# Patient Record
Sex: MALE | Race: WHITE | Employment: OTHER | URBAN - METROPOLITAN AREA
[De-identification: names, ages, dates, MRNs, and addresses within clinical notes are randomized per-mention and may not be internally consistent; named-entity substitution may affect disease eponyms.]

---

## 2017-04-11 PROCEDURE — 84154 ASSAY OF PSA FREE: CPT | Performed by: UROLOGY

## 2017-04-11 PROCEDURE — 84153 ASSAY OF PSA TOTAL: CPT | Performed by: UROLOGY

## 2017-04-11 PROCEDURE — 36415 COLL VENOUS BLD VENIPUNCTURE: CPT | Performed by: UROLOGY

## 2017-05-14 ENCOUNTER — HOSPITAL ENCOUNTER (EMERGENCY)
Facility: HOSPITAL | Age: 82
Discharge: HOME | End: 2017-05-14
Attending: EMERGENCY MEDICINE
Payer: MEDICARE

## 2017-05-14 ENCOUNTER — APPOINTMENT (OUTPATIENT)
Dept: GENERAL RADIOLOGY | Facility: HOSPITAL | Age: 82
End: 2017-05-14
Payer: MEDICARE

## 2017-05-14 VITALS
TEMPERATURE: 98 F | WEIGHT: 160 LBS | DIASTOLIC BLOOD PRESSURE: 89 MMHG | HEIGHT: 69 IN | OXYGEN SATURATION: 98 % | HEART RATE: 91 BPM | RESPIRATION RATE: 16 BRPM | SYSTOLIC BLOOD PRESSURE: 178 MMHG | BODY MASS INDEX: 23.7 KG/M2

## 2017-05-14 DIAGNOSIS — R07.89 CHEST PAIN, ATYPICAL: Primary | ICD-10-CM

## 2017-05-14 PROCEDURE — 85378 FIBRIN DEGRADE SEMIQUANT: CPT | Performed by: EMERGENCY MEDICINE

## 2017-05-14 PROCEDURE — 84484 ASSAY OF TROPONIN QUANT: CPT

## 2017-05-14 PROCEDURE — 93005 ELECTROCARDIOGRAM TRACING: CPT

## 2017-05-14 PROCEDURE — 99285 EMERGENCY DEPT VISIT HI MDM: CPT

## 2017-05-14 PROCEDURE — 85025 COMPLETE CBC W/AUTO DIFF WBC: CPT

## 2017-05-14 PROCEDURE — 36415 COLL VENOUS BLD VENIPUNCTURE: CPT

## 2017-05-14 PROCEDURE — 71010 XR CHEST AP PORTABLE  (CPT=71010): CPT | Performed by: EMERGENCY MEDICINE

## 2017-05-14 PROCEDURE — 83880 ASSAY OF NATRIURETIC PEPTIDE: CPT | Performed by: EMERGENCY MEDICINE

## 2017-05-14 PROCEDURE — 93010 ELECTROCARDIOGRAM REPORT: CPT

## 2017-05-14 PROCEDURE — 80053 COMPREHEN METABOLIC PANEL: CPT

## 2017-05-14 RX ORDER — NICOTINE POLACRILEX 4 MG/1
20 GUM, CHEWING ORAL DAILY
Qty: 30 TABLET | Refills: 0 | Status: SHIPPED | OUTPATIENT
Start: 2017-05-14 | End: 2017-06-06 | Stop reason: ALTCHOICE

## 2017-05-14 NOTE — ED NOTES
DC instructions and RX handed to pt. No distress noted. Pt thanks staff for care. Denies need for [facility] out of ED.

## 2017-05-14 NOTE — ED PROVIDER NOTES
Patient Seen in: [facility] Emergency Department    History   Patient presents with:  Chest Pain Angina (cardiovascular)    Stated Complaint: chest burning    HPI    80-year-old male complaining of burning chest pain.   The patient states that he has US, dr Bonds Dose    COLONOSCOPY  11/2012    Comment Severe pan-colonic diverticulosis, 3 mm transverse adenoma, large internal hemorrhoids.  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 Luis Smith 05/14/17 0910 98.4 °F (36.9 °C)   Temp src 05/14/17 0910 Temporal   SpO2 05/14/17 0910 98 %   O2 Device 05/14/17 0910 None (Room air)       Current:/89 mmHg  Pulse 91  Temp(Src) 98.4 °F (36.9 °C) (Temporal)  Resp 16  Ht 175.3 cm (5' 9\")  Wt 72.576 k individual orders. RAINBOW DRAW BLUE   RAINBOW DRAW GOLD   RAINBOW DRAW LAVENDER   RAINBOW DRAW LIGHT GREEN      EKG    Rate, intervals and axes as noted on EKG Report.   Rate: 97  Rhythm: Sinus Rhythm  Reading: Nonspecific ST wave abnormality this is an

## 2017-05-14 NOTE — ED NOTES
Round on pt. No distress noted. Pt denies any needs at this time.  Pt denies any chest burning at this time

## 2017-06-06 PROBLEM — Z95.1 S/P CABG X 3: Status: ACTIVE | Noted: 2017-06-06

## 2017-06-06 PROBLEM — I48.0 PAROXYSMAL ATRIAL FIBRILLATION (HCC): Status: ACTIVE | Noted: 2017-06-06

## 2017-06-06 PROBLEM — Z79.899 ON AMIODARONE THERAPY: Status: ACTIVE | Noted: 2017-06-06

## 2017-07-04 PROBLEM — Z79.899 ON AMIODARONE THERAPY: Status: RESOLVED | Noted: 2017-06-06 | Resolved: 2017-07-04

## 2017-10-30 PROCEDURE — 84154 ASSAY OF PSA FREE: CPT | Performed by: UROLOGY

## 2017-10-30 PROCEDURE — 84153 ASSAY OF PSA TOTAL: CPT | Performed by: UROLOGY

## 2017-10-30 PROCEDURE — 36415 COLL VENOUS BLD VENIPUNCTURE: CPT | Performed by: UROLOGY

## 2017-11-08 PROCEDURE — 36415 COLL VENOUS BLD VENIPUNCTURE: CPT | Performed by: INTERNAL MEDICINE

## 2017-11-08 PROCEDURE — 80061 LIPID PANEL: CPT | Performed by: INTERNAL MEDICINE

## 2017-11-10 PROBLEM — M70.62 TROCHANTERIC BURSITIS OF LEFT HIP: Status: ACTIVE | Noted: 2017-11-10

## 2018-07-16 PROBLEM — I70.0 AORTO-ILIAC ATHEROSCLEROSIS (HCC): Status: ACTIVE | Noted: 2018-07-16

## 2018-07-16 PROBLEM — I70.8 AORTO-ILIAC ATHEROSCLEROSIS (HCC): Status: ACTIVE | Noted: 2018-07-16

## 2018-11-06 PROBLEM — M70.62 TROCHANTERIC BURSITIS OF LEFT HIP: Status: RESOLVED | Noted: 2017-11-10 | Resolved: 2018-11-06

## 2018-11-06 PROBLEM — R73.01 IFG (IMPAIRED FASTING GLUCOSE): Status: ACTIVE | Noted: 2018-11-06

## 2018-11-06 PROBLEM — I65.23 BILATERAL CAROTID ARTERY STENOSIS: Status: ACTIVE | Noted: 2018-11-06

## 2018-11-06 PROBLEM — I25.10 CORONARY ARTERY DISEASE INVOLVING NATIVE CORONARY ARTERY OF NATIVE HEART WITHOUT ANGINA PECTORIS: Status: ACTIVE | Noted: 2018-11-06

## 2019-05-06 PROBLEM — M46.1 SACROILIITIS (HCC): Status: ACTIVE | Noted: 2019-05-06

## 2019-05-06 PROBLEM — M53.3 SACRO-ILIAC PAIN: Status: ACTIVE | Noted: 2019-05-06

## 2019-05-07 PROBLEM — I73.9 SMALL VESSEL DISEASE (HCC): Status: ACTIVE | Noted: 2019-05-07

## 2019-07-11 PROBLEM — M47.816 LUMBAR SPONDYLOSIS: Status: ACTIVE | Noted: 2019-07-11

## 2019-07-11 PROBLEM — M41.9 SCOLIOSIS OF LUMBAR SPINE: Status: ACTIVE | Noted: 2019-07-11

## 2019-07-23 PROBLEM — M48.061 LUMBAR FORAMINAL STENOSIS: Status: ACTIVE | Noted: 2019-07-23

## 2019-07-23 PROBLEM — M47.816 ARTHRITIS OF FACET JOINT OF LUMBAR SPINE: Status: ACTIVE | Noted: 2019-07-23

## 2019-09-24 PROBLEM — M48.062 LUMBAR STENOSIS WITH NEUROGENIC CLAUDICATION: Status: ACTIVE | Noted: 2019-09-24

## 2019-11-12 PROBLEM — L57.0 ACTINIC KERATOSES: Status: ACTIVE | Noted: 2019-11-12

## 2019-11-14 PROBLEM — J45.30 MILD PERSISTENT ASTHMA WITHOUT COMPLICATION (HCC): Status: ACTIVE | Noted: 2019-11-14

## 2019-11-14 PROBLEM — J45.40 MODERATE PERSISTENT ASTHMA WITHOUT COMPLICATION (HCC): Status: ACTIVE | Noted: 2019-11-14

## 2019-11-14 PROBLEM — J45.40 MODERATE PERSISTENT ASTHMA WITHOUT COMPLICATION: Status: ACTIVE | Noted: 2019-11-14

## 2019-11-14 PROBLEM — J45.30 MILD PERSISTENT ASTHMA WITHOUT COMPLICATION: Status: ACTIVE | Noted: 2019-11-14

## 2019-11-20 ENCOUNTER — LABORATORY ENCOUNTER (OUTPATIENT)
Dept: LAB | Facility: HOSPITAL | Age: 84
End: 2019-11-20
Attending: ORTHOPAEDIC SURGERY
Payer: MEDICARE

## 2019-11-20 ENCOUNTER — HOSPITAL ENCOUNTER (OUTPATIENT)
Dept: PHYSICAL THERAPY | Facility: HOSPITAL | Age: 84
Discharge: HOME | End: 2019-11-20
Attending: ORTHOPAEDIC SURGERY
Payer: MEDICARE

## 2019-11-20 DIAGNOSIS — I25.10 CORONARY ARTERY DISEASE INVOLVING NATIVE CORONARY ARTERY OF NATIVE HEART WITHOUT ANGINA PECTORIS: ICD-10-CM

## 2019-11-20 DIAGNOSIS — I10 HTN (HYPERTENSION): ICD-10-CM

## 2019-11-20 DIAGNOSIS — M48.00 SPINAL STENOSIS: ICD-10-CM

## 2019-11-20 PROCEDURE — 80053 COMPREHEN METABOLIC PANEL: CPT

## 2019-11-20 PROCEDURE — 87081 CULTURE SCREEN ONLY: CPT

## 2019-11-20 PROCEDURE — 93005 ELECTROCARDIOGRAM TRACING: CPT

## 2019-11-20 PROCEDURE — 85025 COMPLETE CBC W/AUTO DIFF WBC: CPT

## 2019-11-20 PROCEDURE — 85610 PROTHROMBIN TIME: CPT

## 2019-11-20 PROCEDURE — 85730 THROMBOPLASTIN TIME PARTIAL: CPT

## 2019-11-20 PROCEDURE — 93010 ELECTROCARDIOGRAM REPORT: CPT | Performed by: INTERNAL MEDICINE

## 2019-11-20 PROCEDURE — 36415 COLL VENOUS BLD VENIPUNCTURE: CPT

## 2019-12-08 ENCOUNTER — APPOINTMENT (OUTPATIENT)
Dept: MRI IMAGING | Facility: HOSPITAL | Age: 84
End: 2019-12-08
Attending: EMERGENCY MEDICINE
Payer: MEDICARE

## 2019-12-08 ENCOUNTER — HOSPITAL ENCOUNTER (EMERGENCY)
Facility: HOSPITAL | Age: 84
Discharge: HOME | End: 2019-12-08
Attending: EMERGENCY MEDICINE
Payer: MEDICARE

## 2019-12-08 VITALS
RESPIRATION RATE: 18 BRPM | TEMPERATURE: 98 F | OXYGEN SATURATION: 98 % | WEIGHT: 159 LBS | SYSTOLIC BLOOD PRESSURE: 130 MMHG | HEIGHT: 69 IN | BODY MASS INDEX: 23.55 KG/M2 | HEART RATE: 81 BPM | DIASTOLIC BLOOD PRESSURE: 85 MMHG

## 2019-12-08 DIAGNOSIS — I74.9 TIA DUE TO EMBOLISM (HCC): ICD-10-CM

## 2019-12-08 DIAGNOSIS — G45.9 TIA DUE TO EMBOLISM (HCC): ICD-10-CM

## 2019-12-08 DIAGNOSIS — G45.9 BRAIN TIA: ICD-10-CM

## 2019-12-08 DIAGNOSIS — R47.01 EXPRESSIVE APHASIA: Primary | ICD-10-CM

## 2019-12-08 PROBLEM — R73.9 HYPERGLYCEMIA: Status: ACTIVE | Noted: 2019-12-08

## 2019-12-08 PROBLEM — E87.1 HYPONATREMIA: Status: ACTIVE | Noted: 2019-12-08

## 2019-12-08 PROCEDURE — 70553 MRI BRAIN STEM W/O & W/DYE: CPT | Performed by: EMERGENCY MEDICINE

## 2019-12-08 PROCEDURE — A9575 INJ GADOTERATE MEGLUMI 0.1ML: HCPCS | Performed by: EMERGENCY MEDICINE

## 2019-12-08 PROCEDURE — 93010 ELECTROCARDIOGRAM REPORT: CPT | Performed by: EMERGENCY MEDICINE

## 2019-12-08 PROCEDURE — 85730 THROMBOPLASTIN TIME PARTIAL: CPT | Performed by: EMERGENCY MEDICINE

## 2019-12-08 PROCEDURE — 99285 EMERGENCY DEPT VISIT HI MDM: CPT | Performed by: EMERGENCY MEDICINE

## 2019-12-08 PROCEDURE — 99284 EMERGENCY DEPT VISIT MOD MDM: CPT | Performed by: EMERGENCY MEDICINE

## 2019-12-08 PROCEDURE — 93005 ELECTROCARDIOGRAM TRACING: CPT

## 2019-12-08 PROCEDURE — 85610 PROTHROMBIN TIME: CPT | Performed by: EMERGENCY MEDICINE

## 2019-12-08 PROCEDURE — 80053 COMPREHEN METABOLIC PANEL: CPT | Performed by: EMERGENCY MEDICINE

## 2019-12-08 PROCEDURE — 85025 COMPLETE CBC W/AUTO DIFF WBC: CPT | Performed by: EMERGENCY MEDICINE

## 2019-12-08 PROCEDURE — 36415 COLL VENOUS BLD VENIPUNCTURE: CPT | Performed by: EMERGENCY MEDICINE

## 2019-12-08 RX ORDER — ONDANSETRON 2 MG/ML
4 INJECTION INTRAMUSCULAR; INTRAVENOUS EVERY 4 HOURS PRN
Status: CANCELLED | OUTPATIENT
Start: 2019-12-08

## 2019-12-08 RX ORDER — ASPIRIN 81 MG/1
81 TABLET, CHEWABLE ORAL ONCE
Status: COMPLETED | OUTPATIENT
Start: 2019-12-08 | End: 2019-12-08

## 2019-12-08 RX ORDER — SODIUM CHLORIDE 9 MG/ML
INJECTION, SOLUTION INTRAVENOUS CONTINUOUS
Status: CANCELLED | OUTPATIENT
Start: 2019-12-08 | End: 2019-12-08

## 2019-12-09 ENCOUNTER — ANESTHESIA (OUTPATIENT)
Dept: SURGERY | Facility: HOSPITAL | Age: 84
DRG: 457 | End: 2019-12-09
Payer: MEDICARE

## 2019-12-09 ENCOUNTER — ANESTHESIA EVENT (OUTPATIENT)
Dept: SURGERY | Facility: HOSPITAL | Age: 84
DRG: 457 | End: 2019-12-09
Payer: MEDICARE

## 2019-12-09 ENCOUNTER — APPOINTMENT (OUTPATIENT)
Dept: GENERAL RADIOLOGY | Facility: HOSPITAL | Age: 84
DRG: 457 | End: 2019-12-09
Attending: ORTHOPAEDIC SURGERY
Payer: MEDICARE

## 2019-12-09 ENCOUNTER — HOSPITAL ENCOUNTER (INPATIENT)
Facility: HOSPITAL | Age: 84
LOS: 2 days | Discharge: HOME | DRG: 457 | End: 2019-12-11
Attending: ORTHOPAEDIC SURGERY | Admitting: ORTHOPAEDIC SURGERY
Payer: MEDICARE

## 2019-12-09 ENCOUNTER — TELEPHONE (OUTPATIENT)
Dept: NEUROLOGY | Facility: CLINIC | Age: 84
End: 2019-12-09

## 2019-12-09 DIAGNOSIS — M48.00 SPINAL STENOSIS: Primary | ICD-10-CM

## 2019-12-09 DIAGNOSIS — I25.10 CORONARY ARTERY DISEASE INVOLVING NATIVE CORONARY ARTERY OF NATIVE HEART WITHOUT ANGINA PECTORIS: ICD-10-CM

## 2019-12-09 DIAGNOSIS — M48.061 SPINAL STENOSIS OF LUMBAR REGION WITHOUT NEUROGENIC CLAUDICATION: ICD-10-CM

## 2019-12-09 DIAGNOSIS — I10 HTN (HYPERTENSION): ICD-10-CM

## 2019-12-09 PROCEDURE — 72020 X-RAY EXAM OF SPINE 1 VIEW: CPT | Performed by: ORTHOPAEDIC SURGERY

## 2019-12-09 PROCEDURE — 00QT0ZZ REPAIR SPINAL MENINGES, OPEN APPROACH: ICD-10-PCS | Performed by: ORTHOPAEDIC SURGERY

## 2019-12-09 PROCEDURE — 85027 COMPLETE CBC AUTOMATED: CPT | Performed by: PHYSICIAN ASSISTANT

## 2019-12-09 PROCEDURE — 88304 TISSUE EXAM BY PATHOLOGIST: CPT | Performed by: ORTHOPAEDIC SURGERY

## 2019-12-09 PROCEDURE — 0SG1071 FUSION OF 2 OR MORE LUMBAR VERTEBRAL JOINTS WITH AUTOLOGOUS TISSUE SUBSTITUTE, POSTERIOR APPROACH, POSTERIOR COLUMN, OPEN APPROACH: ICD-10-PCS | Performed by: ORTHOPAEDIC SURGERY

## 2019-12-09 PROCEDURE — 00BT0ZZ EXCISION OF SPINAL MENINGES, OPEN APPROACH: ICD-10-PCS | Performed by: ORTHOPAEDIC SURGERY

## 2019-12-09 PROCEDURE — 88311 DECALCIFY TISSUE: CPT | Performed by: ORTHOPAEDIC SURGERY

## 2019-12-09 PROCEDURE — 01NB0ZZ RELEASE LUMBAR NERVE, OPEN APPROACH: ICD-10-PCS | Performed by: ORTHOPAEDIC SURGERY

## 2019-12-09 DEVICE — PATCH DURAL DURAMATRIX 1X3: Type: IMPLANTABLE DEVICE | Site: BACK | Status: FUNCTIONAL

## 2019-12-09 DEVICE — DURASEAL® EXACT SPINAL SEALANT SYSTEM 5ML 5 PACK
Type: IMPLANTABLE DEVICE | Site: BACK | Status: FUNCTIONAL
Brand: DURASEAL EXACT SPINAL SEALANT SYSTEM

## 2019-12-09 RX ORDER — LISINOPRIL AND HYDROCHLOROTHIAZIDE 20; 12.5 MG/1; MG/1
1 TABLET ORAL DAILY
Status: DISCONTINUED | OUTPATIENT
Start: 2019-12-10 | End: 2019-12-09 | Stop reason: SDUPTHER

## 2019-12-09 RX ORDER — GLYCOPYRROLATE 0.2 MG/ML
INJECTION, SOLUTION INTRAMUSCULAR; INTRAVENOUS AS NEEDED
Status: DISCONTINUED | OUTPATIENT
Start: 2019-12-09 | End: 2019-12-09 | Stop reason: SURG

## 2019-12-09 RX ORDER — ATORVASTATIN CALCIUM 40 MG/1
40 TABLET, FILM COATED ORAL DAILY
Status: DISCONTINUED | OUTPATIENT
Start: 2019-12-10 | End: 2019-12-11

## 2019-12-09 RX ORDER — GABAPENTIN 600 MG/1
600 TABLET ORAL ONCE
Status: COMPLETED | OUTPATIENT
Start: 2019-12-09 | End: 2019-12-09

## 2019-12-09 RX ORDER — CELECOXIB 200 MG/1
200 CAPSULE ORAL ONCE
Status: COMPLETED | OUTPATIENT
Start: 2019-12-09 | End: 2019-12-09

## 2019-12-09 RX ORDER — METOCLOPRAMIDE HYDROCHLORIDE 5 MG/ML
10 INJECTION INTRAMUSCULAR; INTRAVENOUS EVERY 6 HOURS PRN
Status: DISCONTINUED | OUTPATIENT
Start: 2019-12-09 | End: 2019-12-11

## 2019-12-09 RX ORDER — ONDANSETRON 2 MG/ML
4 INJECTION INTRAMUSCULAR; INTRAVENOUS ONCE
Status: COMPLETED | OUTPATIENT
Start: 2019-12-09 | End: 2019-12-09

## 2019-12-09 RX ORDER — MIDAZOLAM HYDROCHLORIDE 1 MG/ML
INJECTION INTRAMUSCULAR; INTRAVENOUS AS NEEDED
Status: DISCONTINUED | OUTPATIENT
Start: 2019-12-09 | End: 2019-12-09 | Stop reason: SURG

## 2019-12-09 RX ORDER — SODIUM CHLORIDE, SODIUM LACTATE, POTASSIUM CHLORIDE, CALCIUM CHLORIDE 600; 310; 30; 20 MG/100ML; MG/100ML; MG/100ML; MG/100ML
INJECTION, SOLUTION INTRAVENOUS CONTINUOUS
Status: DISCONTINUED | OUTPATIENT
Start: 2019-12-09 | End: 2019-12-09 | Stop reason: HOSPADM

## 2019-12-09 RX ORDER — METOCLOPRAMIDE HYDROCHLORIDE 5 MG/ML
INJECTION INTRAMUSCULAR; INTRAVENOUS AS NEEDED
Status: DISCONTINUED | OUTPATIENT
Start: 2019-12-09 | End: 2019-12-09 | Stop reason: SURG

## 2019-12-09 RX ORDER — MEPERIDINE HYDROCHLORIDE 25 MG/ML
25 INJECTION INTRAMUSCULAR; INTRAVENOUS; SUBCUTANEOUS
Status: DISCONTINUED | OUTPATIENT
Start: 2019-12-09 | End: 2019-12-09 | Stop reason: HOSPADM

## 2019-12-09 RX ORDER — TIZANIDINE 2 MG/1
2 TABLET ORAL 3 TIMES DAILY PRN
Status: DISCONTINUED | OUTPATIENT
Start: 2019-12-09 | End: 2019-12-11

## 2019-12-09 RX ORDER — NEOSTIGMINE METHYLSULFATE 1 MG/ML
INJECTION INTRAVENOUS AS NEEDED
Status: DISCONTINUED | OUTPATIENT
Start: 2019-12-09 | End: 2019-12-09 | Stop reason: SURG

## 2019-12-09 RX ORDER — NALOXONE HYDROCHLORIDE 0.4 MG/ML
80 INJECTION, SOLUTION INTRAMUSCULAR; INTRAVENOUS; SUBCUTANEOUS AS NEEDED
Status: DISCONTINUED | OUTPATIENT
Start: 2019-12-09 | End: 2019-12-09 | Stop reason: HOSPADM

## 2019-12-09 RX ORDER — HYDROMORPHONE HYDROCHLORIDE 1 MG/ML
0.5 INJECTION, SOLUTION INTRAMUSCULAR; INTRAVENOUS; SUBCUTANEOUS EVERY 5 MIN PRN
Status: DISCONTINUED | OUTPATIENT
Start: 2019-12-09 | End: 2019-12-09 | Stop reason: HOSPADM

## 2019-12-09 RX ORDER — ONDANSETRON 2 MG/ML
4 INJECTION INTRAMUSCULAR; INTRAVENOUS AS NEEDED
Status: DISCONTINUED | OUTPATIENT
Start: 2019-12-09 | End: 2019-12-09 | Stop reason: HOSPADM

## 2019-12-09 RX ORDER — HYDROCODONE BITARTRATE AND ACETAMINOPHEN 10; 325 MG/1; MG/1
2 TABLET ORAL EVERY 4 HOURS PRN
Status: DISCONTINUED | OUTPATIENT
Start: 2019-12-09 | End: 2019-12-10

## 2019-12-09 RX ORDER — MORPHINE SULFATE 4 MG/ML
4 INJECTION, SOLUTION INTRAMUSCULAR; INTRAVENOUS EVERY 2 HOUR PRN
Status: DISCONTINUED | OUTPATIENT
Start: 2019-12-09 | End: 2019-12-11

## 2019-12-09 RX ORDER — POLYETHYLENE GLYCOL 3350 17 G/17G
17 POWDER, FOR SOLUTION ORAL DAILY PRN
Status: DISCONTINUED | OUTPATIENT
Start: 2019-12-09 | End: 2019-12-11

## 2019-12-09 RX ORDER — ACETAMINOPHEN 500 MG
1000 TABLET ORAL ONCE
Status: DISCONTINUED | OUTPATIENT
Start: 2019-12-09 | End: 2019-12-09

## 2019-12-09 RX ORDER — MORPHINE SULFATE 4 MG/ML
1 INJECTION, SOLUTION INTRAMUSCULAR; INTRAVENOUS EVERY 2 HOUR PRN
Status: DISCONTINUED | OUTPATIENT
Start: 2019-12-09 | End: 2019-12-11

## 2019-12-09 RX ORDER — SODIUM PHOSPHATE, DIBASIC AND SODIUM PHOSPHATE, MONOBASIC 7; 19 G/133ML; G/133ML
1 ENEMA RECTAL ONCE AS NEEDED
Status: DISCONTINUED | OUTPATIENT
Start: 2019-12-09 | End: 2019-12-11

## 2019-12-09 RX ORDER — SENNOSIDES 8.6 MG
17.2 TABLET ORAL NIGHTLY
Status: DISCONTINUED | OUTPATIENT
Start: 2019-12-09 | End: 2019-12-11

## 2019-12-09 RX ORDER — ALBUTEROL SULFATE 90 UG/1
1 AEROSOL, METERED RESPIRATORY (INHALATION) EVERY 4 HOURS PRN
Status: DISCONTINUED | OUTPATIENT
Start: 2019-12-09 | End: 2019-12-11

## 2019-12-09 RX ORDER — DEXAMETHASONE SODIUM PHOSPHATE 4 MG/ML
VIAL (ML) INJECTION AS NEEDED
Status: DISCONTINUED | OUTPATIENT
Start: 2019-12-09 | End: 2019-12-09 | Stop reason: SURG

## 2019-12-09 RX ORDER — MORPHINE SULFATE 4 MG/ML
2 INJECTION, SOLUTION INTRAMUSCULAR; INTRAVENOUS EVERY 2 HOUR PRN
Status: DISCONTINUED | OUTPATIENT
Start: 2019-12-09 | End: 2019-12-11

## 2019-12-09 RX ORDER — SODIUM CHLORIDE 9 MG/ML
INJECTION, SOLUTION INTRAVENOUS CONTINUOUS
Status: DISCONTINUED | OUTPATIENT
Start: 2019-12-09 | End: 2019-12-11

## 2019-12-09 RX ORDER — ACETAMINOPHEN 325 MG/1
650 TABLET ORAL EVERY 4 HOURS PRN
Status: DISCONTINUED | OUTPATIENT
Start: 2019-12-09 | End: 2019-12-11

## 2019-12-09 RX ORDER — DIPHENHYDRAMINE HCL 25 MG
25 CAPSULE ORAL EVERY 4 HOURS PRN
Status: DISCONTINUED | OUTPATIENT
Start: 2019-12-09 | End: 2019-12-11

## 2019-12-09 RX ORDER — ROCURONIUM BROMIDE 10 MG/ML
INJECTION, SOLUTION INTRAVENOUS AS NEEDED
Status: DISCONTINUED | OUTPATIENT
Start: 2019-12-09 | End: 2019-12-09 | Stop reason: SURG

## 2019-12-09 RX ORDER — DEXAMETHASONE SODIUM PHOSPHATE 4 MG/ML
4 VIAL (ML) INJECTION AS NEEDED
Status: DISCONTINUED | OUTPATIENT
Start: 2019-12-09 | End: 2019-12-09 | Stop reason: HOSPADM

## 2019-12-09 RX ORDER — SODIUM CHLORIDE, SODIUM LACTATE, POTASSIUM CHLORIDE, CALCIUM CHLORIDE 600; 310; 30; 20 MG/100ML; MG/100ML; MG/100ML; MG/100ML
INJECTION, SOLUTION INTRAVENOUS CONTINUOUS
Status: DISCONTINUED | OUTPATIENT
Start: 2019-12-09 | End: 2019-12-11

## 2019-12-09 RX ORDER — CLINDAMYCIN PHOSPHATE 900 MG/50ML
900 INJECTION INTRAVENOUS EVERY 8 HOURS
Status: DISCONTINUED | OUTPATIENT
Start: 2019-12-09 | End: 2019-12-09

## 2019-12-09 RX ORDER — BISACODYL 10 MG
10 SUPPOSITORY, RECTAL RECTAL
Status: DISCONTINUED | OUTPATIENT
Start: 2019-12-09 | End: 2019-12-11

## 2019-12-09 RX ORDER — DIPHENHYDRAMINE HYDROCHLORIDE 50 MG/ML
25 INJECTION INTRAMUSCULAR; INTRAVENOUS EVERY 4 HOURS PRN
Status: DISCONTINUED | OUTPATIENT
Start: 2019-12-09 | End: 2019-12-11

## 2019-12-09 RX ORDER — METOPROLOL SUCCINATE 25 MG/1
25 TABLET, EXTENDED RELEASE ORAL
Status: DISCONTINUED | OUTPATIENT
Start: 2019-12-10 | End: 2019-12-11

## 2019-12-09 RX ORDER — ONDANSETRON 2 MG/ML
4 INJECTION INTRAMUSCULAR; INTRAVENOUS EVERY 4 HOURS PRN
Status: DISCONTINUED | OUTPATIENT
Start: 2019-12-09 | End: 2019-12-09 | Stop reason: HOSPADM

## 2019-12-09 RX ORDER — BUPIVACAINE HYDROCHLORIDE AND EPINEPHRINE 5; 5 MG/ML; UG/ML
INJECTION, SOLUTION EPIDURAL; INTRACAUDAL; PERINEURAL AS NEEDED
Status: DISCONTINUED | OUTPATIENT
Start: 2019-12-09 | End: 2019-12-09 | Stop reason: HOSPADM

## 2019-12-09 RX ORDER — DOCUSATE SODIUM 100 MG/1
100 CAPSULE, LIQUID FILLED ORAL 2 TIMES DAILY
Status: DISCONTINUED | OUTPATIENT
Start: 2019-12-09 | End: 2019-12-11

## 2019-12-09 RX ORDER — SODIUM CHLORIDE 9 MG/ML
INJECTION, SOLUTION INTRAVENOUS CONTINUOUS
Status: DISCONTINUED | OUTPATIENT
Start: 2019-12-09 | End: 2019-12-09 | Stop reason: HOSPADM

## 2019-12-09 RX ORDER — EPHEDRINE SULFATE 50 MG/ML
INJECTION, SOLUTION INTRAVENOUS AS NEEDED
Status: DISCONTINUED | OUTPATIENT
Start: 2019-12-09 | End: 2019-12-09 | Stop reason: SURG

## 2019-12-09 RX ORDER — HYDROCODONE BITARTRATE AND ACETAMINOPHEN 10; 325 MG/1; MG/1
1 TABLET ORAL EVERY 4 HOURS PRN
Status: DISCONTINUED | OUTPATIENT
Start: 2019-12-09 | End: 2019-12-10

## 2019-12-09 RX ORDER — ONDANSETRON 2 MG/ML
4 INJECTION INTRAMUSCULAR; INTRAVENOUS EVERY 4 HOURS PRN
Status: ACTIVE | OUTPATIENT
Start: 2019-12-09 | End: 2019-12-10

## 2019-12-09 RX ORDER — MIDAZOLAM HYDROCHLORIDE 1 MG/ML
1 INJECTION INTRAMUSCULAR; INTRAVENOUS EVERY 5 MIN PRN
Status: DISCONTINUED | OUTPATIENT
Start: 2019-12-09 | End: 2019-12-09 | Stop reason: HOSPADM

## 2019-12-09 RX ADMIN — MIDAZOLAM HYDROCHLORIDE 2 MG: 1 INJECTION INTRAMUSCULAR; INTRAVENOUS at 11:10:00

## 2019-12-09 RX ADMIN — DEXAMETHASONE SODIUM PHOSPHATE 4 MG: 4 MG/ML VIAL (ML) INJECTION at 11:40:00

## 2019-12-09 RX ADMIN — METOCLOPRAMIDE HYDROCHLORIDE 10 MG: 5 INJECTION INTRAMUSCULAR; INTRAVENOUS at 11:40:00

## 2019-12-09 RX ADMIN — ROCURONIUM BROMIDE 50 MG: 10 INJECTION, SOLUTION INTRAVENOUS at 11:13:00

## 2019-12-09 RX ADMIN — EPHEDRINE SULFATE 5 MG: 50 INJECTION, SOLUTION INTRAVENOUS at 11:28:00

## 2019-12-09 RX ADMIN — EPHEDRINE SULFATE 5 MG: 50 INJECTION, SOLUTION INTRAVENOUS at 11:24:00

## 2019-12-09 RX ADMIN — NEOSTIGMINE METHYLSULFATE 3 MG: 1 INJECTION INTRAVENOUS at 13:21:00

## 2019-12-09 RX ADMIN — SODIUM CHLORIDE, SODIUM LACTATE, POTASSIUM CHLORIDE, CALCIUM CHLORIDE: 600; 310; 30; 20 INJECTION, SOLUTION INTRAVENOUS at 13:04:00

## 2019-12-09 RX ADMIN — EPHEDRINE SULFATE 5 MG: 50 INJECTION, SOLUTION INTRAVENOUS at 12:08:00

## 2019-12-09 RX ADMIN — EPHEDRINE SULFATE 5 MG: 50 INJECTION, SOLUTION INTRAVENOUS at 11:59:00

## 2019-12-09 RX ADMIN — GLYCOPYRROLATE 0.4 MG: 0.2 INJECTION, SOLUTION INTRAMUSCULAR; INTRAVENOUS at 13:21:00

## 2019-12-09 RX ADMIN — ROCURONIUM BROMIDE 25 MG: 10 INJECTION, SOLUTION INTRAVENOUS at 11:40:00

## 2019-12-09 NOTE — TELEPHONE ENCOUNTER
Pt already established with [facility] neurologist but hasnt seen since 6/2018. Will call to see if he is going to follow up with him. Spoke with pt he is going to follow up with Dr Lizeth Alston at [facility]    [address]    1.  Date of ED visit/TIA diagnosis:

## 2019-12-09 NOTE — ANESTHESIA PROCEDURE NOTES
Airway  Date/Time: 12/9/2019 10:55 AM  Urgency: elective    Airway not difficult    General Information and Staff    Patient location during procedure: OR  Anesthesiologist: Doris Hand MD  Performed: anesthesiologist     Indications and Patient Ruben

## 2019-12-09 NOTE — ED NOTES
Pt is very motivated to proceed with his surgery in the AM. TIA symptoms have now resolved, he plans to follow through with his appointment in the AM. I also advised him to follow up with neuro for TIA treatment.  I urged him to return this evening/night if

## 2019-12-09 NOTE — ANESTHESIA POSTPROCEDURE EVALUATION
[address] Patient Status:  Surgery Admit - Inpt   Age/Gender 80year old male MRN [id number]   [facility] SURGERY Attending Stephanie Perez MD   Hosp Day # 0 PCP Douglas Mckeon MD       Anesthesia Post-op Note    Proced

## 2019-12-09 NOTE — H&P
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 No guarantees made. If fusion recommended risks of pseaudarthosis, hardware failure/migration were verbalized.

## 2019-12-09 NOTE — ED PROVIDER NOTES
Patient Seen in: [facility] Emergency Department      History   Patient presents with:  Altered Mental Status    Stated Complaint: confusion for past 15 minutes, denies other symptoms, off plavix and aspirin  x*    HPI    This is a 80-year-old male COLONOSCOPY  11/17/06    normal (previous one revealed adenomatous poly)   • COLONOSCOPY  11/2012    Severe pan-colonic diverticulosis, 3 mm transverse adenoma, large internal hemorrhoids.  reepat 5 yrs if healthy   • CREATE EARDRUM OPENING,GEN ANESTH     • Exam    General: . Patient is in no respiratory distress he has a clear speech at this present time. The patient is in no respiratory distress. The patient is not septic or toxic    HEENT: Atraumatic, conjunctiva are not pale. There is no icterus.   Jo Ann Han DRAW BLUE   RAINBOW DRAW LAVENDER   RAINBOW DRAW LIGHT GREEN   RAINBOW DRAW GOLD   CBC W/ DIFFERENTIAL               The patient was placed on monitors, IV was started, blood was drawn. MDM     The EKG shows normal sinus rhythm.   There is no acute S Dictated by: Issa Aleman MD on 12/08/2019 at 21:27     Approved by: Issa Aleman MD on 12/08/2019 at 21:30          The patient's urinalysis was negative. Comprehensive shows slightly elevated BUN at 25.     CBC was normal.  The patient was reexamined is neur Group, [address] [facility], [address] [place] [address] [address]. [address] [facility] Lewis Huerta  Call  choose option 1 for general neurology and state that you are following up for TIA    Kamryn Dowling MD  [address]

## 2019-12-09 NOTE — ED INITIAL ASSESSMENT (HPI)
At approx 7p patient was speaking with his wife and was unable to state his home address, couldn't express his wife's name, \"keri\". He has been off his plavix and aspirin x5 days because he has back surgery due tomorrow.  Hx of TIA a few years ago

## 2019-12-09 NOTE — PROGRESS NOTES
S: he has some back pain no leg pain. Inspection:  Awake alert No acute distress. No difficulty breathing     Blood pressure (!) 172/84, pulse 88, temperature 98 °F (36.7 °C), temperature source Oral, resp.  rate 16, height 5' 9\" (1.753 m), weight 162

## 2019-12-09 NOTE — ANESTHESIA PREPROCEDURE EVALUATION
PRE-OP EVALUATION    Patient Name: Kevyn Cespedes    Pre-op Diagnosis: Spinal stenosis of lumbar region without neurogenic claudication [M48.061]    Procedure(s):  LUMBAR 2- LUMBAR 3, LUMBAR 3- LUMBAR 4 DECOMPRESSION WITH UNINSTRUMENTED FUSION    Surgeon( (COQ10) 30 MG Oral Cap, Take 1 capsule by mouth daily. , Disp: 30 capsule, Rfl: 0        Allergies: Cephalosporins; Cat Dander [Dander]; Polysporin [Bacitracin-Polymyxin B]; Vaseline [Petrolatum]      Anesthesia Evaluation    Patient summary reviewed.     An 0.20        Years: 58.00        Pack years: 11.6        Quit date: 1992        Years since quittin.0      Smokeless tobacco: Never Used    Alcohol use: No      Frequency: Never      Binge frequency: Never      Drug use: No     Available pre-op l increased bleeding, blindness, increased chance for blood tx, etc.  Plan/risks discussed with: patient and spouse  Use of blood product(s) discussed with: patient and spouse              Present on Admission:  **None**

## 2019-12-09 NOTE — BRIEF OP NOTE
Pre-Operative Diagnosis: Spinal stenosis of lumbar region without neurogenic claudication [M48.061]     Post-Operative Diagnosis: Spinal stenosis of lumbar region without neurogenic claudication [M48.061] durotomy     Procedure Performed:   Procedure(s):

## 2019-12-10 PROBLEM — M48.00 SPINAL STENOSIS: Status: ACTIVE | Noted: 2019-09-24

## 2019-12-10 PROCEDURE — 51702 INSERT TEMP BLADDER CATH: CPT

## 2019-12-10 PROCEDURE — 97116 GAIT TRAINING THERAPY: CPT

## 2019-12-10 PROCEDURE — 97161 PT EVAL LOW COMPLEX 20 MIN: CPT

## 2019-12-10 PROCEDURE — 97530 THERAPEUTIC ACTIVITIES: CPT

## 2019-12-10 PROCEDURE — 94640 AIRWAY INHALATION TREATMENT: CPT

## 2019-12-10 PROCEDURE — 97165 OT EVAL LOW COMPLEX 30 MIN: CPT

## 2019-12-10 PROCEDURE — 85027 COMPLETE CBC AUTOMATED: CPT | Performed by: PHYSICIAN ASSISTANT

## 2019-12-10 RX ORDER — HYDROCODONE BITARTRATE AND ACETAMINOPHEN 5; 325 MG/1; MG/1
1 TABLET ORAL EVERY 6 HOURS PRN
Status: DISCONTINUED | OUTPATIENT
Start: 2019-12-10 | End: 2019-12-11

## 2019-12-10 NOTE — PROGRESS NOTES
2nd visit  He is sitting upright and no headeche. No leg pain. He is awaitng PT.     I will change Norco to 5mg as he is narcotic naive

## 2019-12-10 NOTE — PLAN OF CARE
Pt AOx4. R.A. C/o 5/10 pain to low back, currrently declines pain meds stating this is manageable. Pt has never had narcotics in past, educated in depth on pain control process and medications available. Pt and wife verbalized understanding.   Tasha bower

## 2019-12-10 NOTE — PLAN OF CARE
RECD ALERT ,AWAKE, ORIENTED, ANXIOUS. DENIES HEADACHE. HOB 30 DEGREES ASSISTED WITH PA. BP 85/44 P 92 RESTART IV 0.9 100CC/HR. PT AGREE WITH THE PLAN OF CARE. CALL LIGHT W/IN REACH TO CALL FOR ALL NEEDS AND ASSISTANCE

## 2019-12-10 NOTE — OCCUPATIONAL THERAPY NOTE
Attempted to see patient for OT eval this am, however per RN patient remains on bedrest, working on raising [address] [facility][1],[address]. Will reattempt as able and as patient appropriate.

## 2019-12-10 NOTE — CONSULTS
[facility] Hospitalist Initial Consult       Reason for consult: Medical Management sp lumbar decompression       History of Present Illness: Patient is a 80year old male with PMH sig for CAD, HTN who presents sp above surgery.    They tolerated the procedure wel yrs if healthy   • CREATE EARDRUM OPENING,GEN ANESTH     • CYSTOURETHROSCOPY  6/29/10    cysto, Dr. Reagan Szymanski   • LUMBAR EPIDURAL N/A 9/5/2019    Performed by Tabitha Bennett MD at [address] [place] [address] midline, no cervical or supraclavicular lymph adenopathy, thyroid: no enlargment/tenderness/nodules appreciated   Lungs:   Clear to auscultation bilaterally. Normal effort   Chest wall:  No tenderness or deformity.    Heart:  Regular rate and rhythm, S1, S2

## 2019-12-10 NOTE — PROGRESS NOTES
12/10/19 1041   Clinical Encounter Type   Visited With Patient and family together  (Wife present. Went over [employer] Considering it together.  Will [place]  when they are ready. )   Routine Visit Introduction   Referral From Other (Comment)  (consult)

## 2019-12-10 NOTE — PHYSICAL THERAPY NOTE
PHYSICAL THERAPY EVALUATION - INPATIENT     Room Number: 374/374-A  Evaluation Date: 12/10/2019  Type of Evaluation: Initial  Physician Order: PT Eval and Treat    Presenting Problem: S/P L2-L4 decompressio fusion with dural reapair  Reason for GALINA ORTEGA [facility] • LUMBAR EPIDURAL N/A 9/5/2019    Performed by Dejah Murray MD at [address] Right 8/5/2019    Performed by Dejah Murray MD at [address]   • LUMBAR DENTON Standing: Fair -  Dynamic Standing: Fair -    ADDITIONAL TESTS                                    NEUROLOGICAL FINDINGS                      ACTIVITY TOLERANCE                         O2 WALK                  AM-PAC '6-Clicks' INPATIENT SHORT FORM - BASIC pumps. Pt verbalizes understanding. RN and PCT aware of session.     Exercise/Education Provided:  Bed mobility  Body mechanics  Functional activity tolerated  Gait training  Posture  Transfer training    Patient End of Session: Up in chair;Needs met;Call l Daily  Number of Visits to Meet Established Goals: 2      CURRENT GOALS    Goal #1 Patient is able to demonstrate supine - sit EOB @ level: modified independent     Goal #2 Patient is able to demonstrate transfers EOB to/from Chair/Wheelchair at assistance

## 2019-12-10 NOTE — PHYSICAL THERAPY NOTE
PT eval orders received, chart reviewed, attempted PT eval. Per RN Ai Moody pt remains on bedrest and working on [facility] at this time, requests check back later when pt cleared for OOB. Will check back as schedule allows and pt appropriate.

## 2019-12-10 NOTE — OCCUPATIONAL THERAPY NOTE
OCCUPATIONAL THERAPY EVALUATION - INPATIENT     Room Number: 374/374-A  Evaluation Date: 12/10/2019  Type of Evaluation: Initial  Presenting Problem: s/p L2-4 decompression and fusion with dural repair 12/9/19    Physician Order: IP Consult to Occupational yrs if healthy   • CREATE EARDRUM OPENING,GEN ANESTH     • CYSTOURETHROSCOPY  6/29/10    cysto, Dr. Reagan Szymanski   • LUMBAR EPIDURAL N/A 9/5/2019    Performed by Tabitha Bennett MD at [address] [place] [address] techniques;Relaxation;Repositioning    COGNITION  Overall Cognitive Status:  WFL - within functional limits    Communication: WFL    Behavioral/Emotional/Social: WFL    RANGE OF MOTION AND STRENGTH ASSESSMENT  Upper extremity ROM is within functional limit chair via RW, CGA, increased time and cues for upright posture; sitting via CGA and safety cues. Patient also educated on OT role, safety, fall prevention, pain management with good verbal understanding. Patient End of Session: Up in chair; With [facility] staff; training;Functional transfer training; Endurance training;Patient/Family education;Patient/Family training; Compensatory technique education  Rehab Potential : Good  Frequency (Obs): 5x/week  Number of Visits to Meet Established Goals: 2    ADL GOALS   Gregory

## 2019-12-10 NOTE — PLAN OF CARE
POD 1 L2-L4 fusion with bone graft by Dr GREY. Pt A&Ox4. Room air. Incentive spirometer and ankle pumps encouraged. BP low this shift. Pt c/o dizziness. 500 mL bolus given and BP increased and asymptomatic. Incision to low back with Mircrfoam lis C/D/I.  D

## 2019-12-10 NOTE — PROGRESS NOTES
Pt states he wishes to be a DNR. Living will paperwork placed on chart. Dr. Alexandra Hernandez paged for order. Shashank [address] Rudolph Patino rn made aware.

## 2019-12-10 NOTE — PROGRESS NOTES
S: he has controlled back pain no headache. No leg pain. He had a ascencio placed last night    Inspection:  Awake alert No acute distress.  No difficulty breathing     Blood pressure 90/36, pulse 75, temperature 98.4 °F (36.9 °C), temperature source Oral, r

## 2019-12-10 NOTE — PLAN OF CARE
HOB UP TO 45 DEGREES. SALMA SO FAR ,DENIES HA. 1100 UP TO 90 DEGREES, P.T. O.T INFORMED SO CAN WORK WITH PT.WIFE AT BEDSIDE

## 2019-12-11 VITALS
OXYGEN SATURATION: 97 % | BODY MASS INDEX: 24 KG/M2 | TEMPERATURE: 98 F | HEART RATE: 87 BPM | HEIGHT: 69 IN | DIASTOLIC BLOOD PRESSURE: 61 MMHG | RESPIRATION RATE: 18 BRPM | WEIGHT: 162.06 LBS | SYSTOLIC BLOOD PRESSURE: 133 MMHG

## 2019-12-11 PROCEDURE — 97530 THERAPEUTIC ACTIVITIES: CPT

## 2019-12-11 PROCEDURE — 97535 SELF CARE MNGMENT TRAINING: CPT

## 2019-12-11 PROCEDURE — 97116 GAIT TRAINING THERAPY: CPT

## 2019-12-11 RX ORDER — TRAMADOL HYDROCHLORIDE 50 MG/1
50 TABLET ORAL EVERY 6 HOURS PRN
Qty: 40 TABLET | Refills: 0 | Status: SHIPPED | OUTPATIENT
Start: 2019-12-11 | End: 2019-12-17

## 2019-12-11 RX ORDER — TRAMADOL HYDROCHLORIDE 50 MG/1
50 TABLET ORAL EVERY 6 HOURS PRN
Status: DISCONTINUED | OUTPATIENT
Start: 2019-12-11 | End: 2019-12-11

## 2019-12-11 RX ORDER — HYDROCODONE BITARTRATE AND ACETAMINOPHEN 5; 325 MG/1; MG/1
1 TABLET ORAL EVERY 4 HOURS PRN
Status: DISCONTINUED | OUTPATIENT
Start: 2019-12-11 | End: 2019-12-11

## 2019-12-11 RX ORDER — PSEUDOEPHEDRINE HCL 30 MG
100 TABLET ORAL 2 TIMES DAILY PRN
Qty: 30 CAPSULE | Refills: 0 | Status: SHIPPED | OUTPATIENT
Start: 2019-12-11 | End: 2020-03-03

## 2019-12-11 NOTE — OCCUPATIONAL THERAPY NOTE
OCCUPATIONAL THERAPY TREATMENT NOTE - INPATIENT     Room Number: 374/374-A  Session: 1   Number of Visits to Meet Established Goals: 2    Presenting Problem: s/p L2-4 decompression and fusion with dural repair 12/9/19    History related to current admissio LUMBAR EPIDURAL N/A 9/5/2019    Performed by Aminata Validvia MD at [address] Right 8/5/2019    Performed by Aminata Valdivia MD at [address] [facility] IGOR    FUNCTIONAL TRANSFER ASSESSMENT  Supine to Sit : Supervision  Sit to Stand: Supervision    Skilled Therapy Provided:  Activities performed this session include: Education on spinal precautions, body mechanics, back protection principles, and incorporat education  Rehab Potential : Good  Frequency (Obs): 5x/week    ADL GOALS   Patient will perform Lower Body Dressing with supervision --> MET 12/11  Patient will perform Toileting with supervision --> MET 12/11    FUNCTIONAL TRANSFER GOALS   Patient will tr

## 2019-12-11 NOTE — PROGRESS NOTES
12/11/19 1418   Clinical Encounter Type   Visited With Health care provider  (NICOLE Evans)   Routine Visit   (Responded to POLST consult)   POLST form on chart; waiting for signatures. Checked in with the patient's RN.   remain available at the

## 2019-12-11 NOTE — PROGRESS NOTES
(wife)  attended discharge spine education class. Printed discharge education sheet provided and reviewed. Teach back done. Questions solicited and answered.

## 2019-12-11 NOTE — PHYSICAL THERAPY NOTE
PHYSICAL THERAPY TREATMENT NOTE - INPATIENT    Room Number: 374/374-A     Session: 1   Number of Visits to Meet Established Goals: 2    Presenting Problem: S/P L2-L4 decompressio fusion with dural reapair     History related to current admission: Pt is 85 MANAGEMENT   • LUMBAR FACET INJECTION OR MEDIAL BRANCH NERVE BLOCK Right 8/5/2019    Performed by Padma Cardona MD at [facility] W/ BONE GRAFT 2 LEVEL N/A 12/9/2019    Performed by Shanna Jackson MD at  [facility]   -   Climbing 3-5 steps with a railing?: [facility]       AM-PAC Score:  Raw Score: 21   Approx Degree of Impairment: 28.97%   Standardized Score (AM-PAC Scale): 50.25   CMS Modifier (G-Code): CJ    FUNCTIONAL ABILITY STATUS  Gait Assessment   Gait A training;Transfer training;Balance training  Rehab Potential : Good  Frequency (Obs): Daily    CURRENT GOALS     Goal #1 Patient is able to demonstrate supine - sit EOB @ level: modified independent      Goal #2 Patient is able to demonstrate transfers EOB

## 2019-12-11 NOTE — CM/SW NOTE
12/11/19 1000   CM/SW Referral Data   Referral Source Social Work (self-referral)   Reason for Referral Discharge [facility] Staff       HOME SITUATION  Type of Home: Condo   Home Layout: Two level  Stairs to Enter : 2  Railing: No  Sta

## 2019-12-11 NOTE — PROGRESS NOTES
Pt cleared by all MD's for discharge. Discharge education completed at bedside with pt and wife, all questions answered. PIV removed, belongings packed. Scripts for tramadol given to patient. Pt discharging to home via wheelchair.

## 2019-12-11 NOTE — PLAN OF CARE
Pt AOx4. R.A. C/o 5/10 pain to low back, well controlled on Norco 5. Microfoam dressing to low back, CDI.  drain to gravity, Spine USMAN Pastor on floor in morning. Had to leave, will be back this afternoon to remove. Gel ice in place.   VS remain stab

## 2019-12-11 NOTE — PROGRESS NOTES
S: he has controlled back pain no headaches and no leg pain. He passed therapy. He had norco but got somewhat confused    Inspection:  Awake alert No acute distress.  No difficulty breathing     Blood pressure 133/61, pulse 87, temperature 98.4 °F (36.9 °

## 2019-12-11 NOTE — PROGRESS NOTES
[facility] Hospitalist Progress Note     PCP: Brigette Marx MD    CC:  Follow up    SUBJECTIVE:  No CP, SOB, or N/V.  Laying in bed, +U, +flatus    OBJECTIVE:  Temp:  [97.7 °F (36.5 °C)-99.4 °F (37.4 °C)] 99.4 °F (37.4 °C)  Pulse:  [86-99] 89  Resp:  [16-18] 16 diphenhydrAMINE HCl, acetaminophen **OR** [DISCONTINUED] HYDROcodone-acetaminophen **OR** [DISCONTINUED] HYDROcodone-acetaminophen, morphINE sulfate **OR** morphINE sulfate **OR** morphINE sulfate, tiZANidine HCl, Albuterol Sulfate HFA       Assessment/Lesa

## 2019-12-11 NOTE — PLAN OF CARE
POD 2 L2-L4 fusion with bone graft by Dr Mendy Eagle. Pt A&Ox4. Room air. Incentive spirometer and ankle pumps encouraged. Incision to low back with Microfoam lis C/D/I. Denies c/o headache. Davol drain to gravity draining scant sangenous fluid.  Strong bilateral

## 2019-12-11 NOTE — PROGRESS NOTES
Spine video played from patient. Wife attended discharge class. Dressing change completed with wife at bedside, extra dressing/tape given.

## 2019-12-14 PROBLEM — M53.3 SACRO-ILIAC PAIN: Status: RESOLVED | Noted: 2019-05-06 | Resolved: 2019-12-14

## 2019-12-14 PROBLEM — Z98.890 HISTORY OF LUMBAR LAMINECTOMY FOR SPINAL CORD DECOMPRESSION: Status: ACTIVE | Noted: 2019-12-14

## 2019-12-16 PROBLEM — G45.9 TIA (TRANSIENT ISCHEMIC ATTACK): Status: ACTIVE | Noted: 2019-12-16

## 2019-12-23 NOTE — DISCHARGE SUMMARY
[facility]  Discharge Summary    Valerie Steele Patient Status:  Inpatient    1934 MRN [id number]   [facility] 3SW-A Attending No att. providers found   Hosp Day # 2 PCP Trixie Howard MD     Date of Admission: 2019    Date o [id number] 12/9/19 LUMBAR LAMINECTOMY FUSION W/ BONE GRAFT 2 LEVEL Riddhi Wynn MD [facility] MAIN OR Comp        Afterward, pt was brought to the PACU in stable condition.   After the recovery room the patient was transferred to the floor using standard protocol mg by mouth daily. , Historical    Multiple Vitamins-Minerals (OCUVITE EXTRA) Oral Tab  Take 1 tablet by mouth daily. , Historical, Disp-30 tablet, R-0    Coenzyme Q10 (COQ10) 30 MG Oral Cap  Take 1 capsule by mouth daily. , Historical, Disp-30 capsule, R-0

## 2019-12-27 NOTE — OPERATIVE REPORT
[facility]    PATIENT'S NAME: Tracey Sindy   ATTENDING PHYSICIAN: Rivas Giron M.D. OPERATING PHYSICIAN: Rivas Giron M.D.    PATIENT ACCOUNT#:   [de-identified]    LOCATION:  [address] [place] [address]  MEDICAL RECORD #:   [id number]       DATE OF BIRTH:  11 was used for hemostasis. Dissection was taken down to the level of the fascia. Subperiosteal dissection was taken at the level of the facet joints but keeping the facet capsules entirely intact. Self-retaining retractors were applied.     We first began a conclusion of the decompression, all areas were free of neurologic compression. The wound was thoroughly irrigated. Hemostasis was maintained. FloSeal was applied to help with hemostasis and then was irrigated away.  Valsalva maneuver confirmed that th decompression as well. This was done using undercutting technique up to the level of the pedicle, with an extraforaminal area also being decompressed with the undercutting technique.   At the conclusion of the decompression, all areas were free of neurologi

## 2020-02-01 PROBLEM — Z98.1 S/P SPINAL FUSION: Status: ACTIVE | Noted: 2020-02-01

## 2020-10-31 PROBLEM — D04.61 SQUAMOUS CELL CARCINOMA IN SITU (SCCIS) OF DORSUM OF RIGHT HAND: Status: ACTIVE | Noted: 2020-10-31

## 2020-11-05 ENCOUNTER — ORDER TRANSCRIPTION (OUTPATIENT)
Dept: ADMINISTRATIVE | Facility: HOSPITAL | Age: 85
End: 2020-11-05

## 2020-11-05 DIAGNOSIS — R40.4 TRANSIENT ALTERATION OF AWARENESS: Primary | ICD-10-CM

## 2020-11-08 PROBLEM — R73.9 HYPERGLYCEMIA: Status: RESOLVED | Noted: 2019-12-08 | Resolved: 2020-11-08

## 2020-11-08 PROBLEM — R47.01 EXPRESSIVE APHASIA: Status: RESOLVED | Noted: 2019-12-08 | Resolved: 2020-11-08

## 2020-11-08 PROBLEM — Z86.73 H/O TIA (TRANSIENT ISCHEMIC ATTACK) AND STROKE: Status: ACTIVE | Noted: 2020-11-08

## 2020-11-08 PROBLEM — M41.9 SCOLIOSIS OF LUMBAR SPINE: Status: RESOLVED | Noted: 2019-07-11 | Resolved: 2020-11-08

## 2020-11-08 PROBLEM — Z86.73 H/O TIA (TRANSIENT ISCHEMIC ATTACK) AND STROKE: Status: RESOLVED | Noted: 2020-11-08 | Resolved: 2020-11-08

## 2020-11-08 PROBLEM — E87.1 HYPONATREMIA: Status: RESOLVED | Noted: 2019-12-08 | Resolved: 2020-11-08

## 2020-11-10 PROBLEM — I74.9 TIA DUE TO EMBOLISM (HCC): Status: ACTIVE | Noted: 2019-12-16

## 2020-11-10 PROBLEM — G45.9 TIA DUE TO EMBOLISM (HCC): Status: ACTIVE | Noted: 2019-12-16

## 2020-11-11 ENCOUNTER — NURSE ONLY (OUTPATIENT)
Dept: ELECTROPHYSIOLOGY | Facility: HOSPITAL | Age: 85
End: 2020-11-11
Attending: Other
Payer: MEDICARE

## 2020-11-11 DIAGNOSIS — R40.4 TRANSIENT ALTERATION OF AWARENESS: ICD-10-CM

## 2020-11-11 PROCEDURE — 95700 EEG CONT REC W/VID EEG TECH: CPT

## 2020-11-11 PROCEDURE — 95708 EEG WO VID EA 12-26HR UNMNTR: CPT

## 2020-11-11 PROCEDURE — 95721 EEG PHY/QHP>36<60 HR W/O VID: CPT

## 2020-11-21 NOTE — PROCEDURES
[address]      PATIENT'S NAME: Milton Mays   ATTENDING PHYSICIAN: Dulce Wahl M.D.   REQUESTING PHYSICIAN:    PATIENT ACCOUNT #: [de-identified] LOCATION: EEG   ED[facility]   MEDICAL RECORD #: E Sleep architecture was well-formed and symmetric, with no evidence of focal slowing or seizure activity seen.      IMPRESSION:  This is a normal 47-hour ambulatory EEG, with no clinical or electroencephalographic seizures noted, no focal slowing, and no sha

## 2021-04-19 ENCOUNTER — APPOINTMENT (OUTPATIENT)
Dept: GENERAL RADIOLOGY | Facility: HOSPITAL | Age: 86
End: 2021-04-19
Payer: MEDICARE

## 2021-04-19 ENCOUNTER — APPOINTMENT (OUTPATIENT)
Dept: CT IMAGING | Facility: HOSPITAL | Age: 86
End: 2021-04-19
Attending: EMERGENCY MEDICINE
Payer: MEDICARE

## 2021-04-19 ENCOUNTER — HOSPITAL ENCOUNTER (OUTPATIENT)
Facility: HOSPITAL | Age: 86
Setting detail: OBSERVATION
Discharge: HOME | End: 2021-04-21
Attending: EMERGENCY MEDICINE | Admitting: HOSPITALIST
Payer: MEDICARE

## 2021-04-19 DIAGNOSIS — I20.0 UNSTABLE ANGINA (HCC): Primary | ICD-10-CM

## 2021-04-19 PROCEDURE — 85025 COMPLETE CBC W/AUTO DIFF WBC: CPT

## 2021-04-19 PROCEDURE — 36415 COLL VENOUS BLD VENIPUNCTURE: CPT

## 2021-04-19 PROCEDURE — 83880 ASSAY OF NATRIURETIC PEPTIDE: CPT | Performed by: EMERGENCY MEDICINE

## 2021-04-19 PROCEDURE — 80053 COMPREHEN METABOLIC PANEL: CPT

## 2021-04-19 PROCEDURE — 84484 ASSAY OF TROPONIN QUANT: CPT | Performed by: INTERNAL MEDICINE

## 2021-04-19 PROCEDURE — 85610 PROTHROMBIN TIME: CPT | Performed by: EMERGENCY MEDICINE

## 2021-04-19 PROCEDURE — 84484 ASSAY OF TROPONIN QUANT: CPT

## 2021-04-19 PROCEDURE — 99285 EMERGENCY DEPT VISIT HI MDM: CPT

## 2021-04-19 PROCEDURE — 80053 COMPREHEN METABOLIC PANEL: CPT | Performed by: EMERGENCY MEDICINE

## 2021-04-19 PROCEDURE — 84484 ASSAY OF TROPONIN QUANT: CPT | Performed by: EMERGENCY MEDICINE

## 2021-04-19 PROCEDURE — 96372 THER/PROPH/DIAG INJ SC/IM: CPT

## 2021-04-19 PROCEDURE — 93010 ELECTROCARDIOGRAM REPORT: CPT

## 2021-04-19 PROCEDURE — 93005 ELECTROCARDIOGRAM TRACING: CPT

## 2021-04-19 PROCEDURE — 85025 COMPLETE CBC W/AUTO DIFF WBC: CPT | Performed by: EMERGENCY MEDICINE

## 2021-04-19 PROCEDURE — 71045 X-RAY EXAM CHEST 1 VIEW: CPT

## 2021-04-19 PROCEDURE — 74177 CT ABD & PELVIS W/CONTRAST: CPT | Performed by: EMERGENCY MEDICINE

## 2021-04-19 RX ORDER — ASPIRIN 325 MG
325 TABLET, DELAYED RELEASE (ENTERIC COATED) ORAL DAILY
Status: DISCONTINUED | OUTPATIENT
Start: 2021-04-20 | End: 2021-04-21

## 2021-04-19 RX ORDER — NITROGLYCERIN 0.4 MG/1
0.4 TABLET SUBLINGUAL EVERY 5 MIN PRN
Status: DISCONTINUED | OUTPATIENT
Start: 2021-04-19 | End: 2021-04-21

## 2021-04-19 RX ORDER — PANTOPRAZOLE SODIUM 40 MG/1
40 TABLET, DELAYED RELEASE ORAL
Status: DISCONTINUED | OUTPATIENT
Start: 2021-04-20 | End: 2021-04-21

## 2021-04-19 RX ORDER — ATORVASTATIN CALCIUM 40 MG/1
40 TABLET, FILM COATED ORAL NIGHTLY
Status: DISCONTINUED | OUTPATIENT
Start: 2021-04-19 | End: 2021-04-21

## 2021-04-19 RX ORDER — HEPARIN SODIUM 5000 [USP'U]/ML
5000 INJECTION, SOLUTION INTRAVENOUS; SUBCUTANEOUS EVERY 8 HOURS SCHEDULED
Status: DISCONTINUED | OUTPATIENT
Start: 2021-04-19 | End: 2021-04-21

## 2021-04-19 RX ORDER — ALBUTEROL SULFATE 90 UG/1
1-2 AEROSOL, METERED RESPIRATORY (INHALATION) EVERY 4 HOURS PRN
COMMUNITY

## 2021-04-19 RX ORDER — VITS A,C,E/LUTEIN/MINERALS 300MCG-200
1 TABLET ORAL DAILY
Status: DISCONTINUED | OUTPATIENT
Start: 2021-04-19 | End: 2021-04-21

## 2021-04-19 RX ORDER — CLOPIDOGREL BISULFATE 75 MG/1
75 TABLET ORAL DAILY
Status: DISCONTINUED | OUTPATIENT
Start: 2021-04-19 | End: 2021-04-21

## 2021-04-19 RX ORDER — METOPROLOL SUCCINATE 50 MG/1
50 TABLET, EXTENDED RELEASE ORAL
Status: DISCONTINUED | OUTPATIENT
Start: 2021-04-19 | End: 2021-04-21

## 2021-04-19 RX ORDER — METOPROLOL SUCCINATE 25 MG/1
25 TABLET, EXTENDED RELEASE ORAL DAILY
Status: ON HOLD | COMMUNITY
End: 2021-04-20

## 2021-04-19 RX ORDER — ASPIRIN 325 MG
325 TABLET, DELAYED RELEASE (ENTERIC COATED) ORAL DAILY
Status: DISCONTINUED | OUTPATIENT
Start: 2021-04-20 | End: 2021-04-19

## 2021-04-19 RX ORDER — ONDANSETRON 2 MG/ML
4 INJECTION INTRAMUSCULAR; INTRAVENOUS EVERY 6 HOURS PRN
Status: DISCONTINUED | OUTPATIENT
Start: 2021-04-19 | End: 2021-04-21

## 2021-04-19 RX ORDER — DOXEPIN HYDROCHLORIDE 50 MG/1
1 CAPSULE ORAL DAILY
Status: DISCONTINUED | OUTPATIENT
Start: 2021-04-19 | End: 2021-04-21

## 2021-04-19 RX ORDER — ASPIRIN 81 MG/1
324 TABLET, CHEWABLE ORAL ONCE
Status: COMPLETED | OUTPATIENT
Start: 2021-04-19 | End: 2021-04-19

## 2021-04-19 RX ORDER — ATORVASTATIN CALCIUM 40 MG/1
40 TABLET, FILM COATED ORAL NIGHTLY
COMMUNITY
End: 2021-12-22

## 2021-04-19 RX ORDER — METOCLOPRAMIDE HYDROCHLORIDE 5 MG/ML
10 INJECTION INTRAMUSCULAR; INTRAVENOUS EVERY 8 HOURS PRN
Status: DISCONTINUED | OUTPATIENT
Start: 2021-04-19 | End: 2021-04-21

## 2021-04-19 RX ORDER — AMLODIPINE BESYLATE 5 MG/1
5 TABLET ORAL DAILY
Status: DISCONTINUED | OUTPATIENT
Start: 2021-04-19 | End: 2021-04-21

## 2021-04-19 RX ORDER — ALBUTEROL SULFATE 90 UG/1
1-2 AEROSOL, METERED RESPIRATORY (INHALATION) EVERY 4 HOURS PRN
Status: DISCONTINUED | OUTPATIENT
Start: 2021-04-19 | End: 2021-04-21

## 2021-04-19 RX ORDER — UBIDECARENONE 30 MG
1 CAPSULE ORAL DAILY
Status: DISCONTINUED | OUTPATIENT
Start: 2021-04-19 | End: 2021-04-19 | Stop reason: RX

## 2021-04-19 RX ORDER — LISINOPRIL 20 MG/1
20 TABLET ORAL DAILY
Status: DISCONTINUED | OUTPATIENT
Start: 2021-04-19 | End: 2021-04-21

## 2021-04-19 RX ORDER — CLOPIDOGREL BISULFATE 75 MG/1
75 TABLET ORAL DAILY
COMMUNITY

## 2021-04-19 RX ORDER — MULTIVITAMIN WITH FOLIC ACID 400 MCG
1 TABLET ORAL DAILY
COMMUNITY

## 2021-04-19 RX ORDER — CALCIUM CARBONATE 200(500)MG
500 TABLET,CHEWABLE ORAL DAILY
Status: DISCONTINUED | OUTPATIENT
Start: 2021-04-19 | End: 2021-04-21

## 2021-04-19 RX ORDER — ACETAMINOPHEN 325 MG/1
650 TABLET ORAL EVERY 6 HOURS PRN
Status: DISCONTINUED | OUTPATIENT
Start: 2021-04-19 | End: 2021-04-21

## 2021-04-19 NOTE — ED PROVIDER NOTES
Patient Seen in: [facility] Emergency Department      History   Patient presents with:  Chest Pain Angina    Stated Complaint:     HPI/Subjective:   HPI    Patient comes in with epigastric pain and heartburn type feeling intermittent over the past 2 OPENING,GEN ANESTH     • CYSTOURETHROSCOPY  6/29/10    cysto, Dr. Rishabh Xiao   • OTHER SURGICAL HISTORY  8-19-10    PVP with hps laser, dr Rishabh Xiao   • OTHER SURGICAL HISTORY  3-15-11    flow US, dr Rishabh Xiao   • PROSTATE BIOPSIES  6-15-10    PNBx, dr Rishabh Xiao   • TONSILLECTOMY following components:       Result Value    Glucose 138 (*)     Sodium 130 (*)     Calculated Osmolality 272 (*)     All other components within normal limits   CBC W/ DIFFERENTIAL - Abnormal; Notable for the following components:    WBC 13.2 (*)     Neutr improved during his stay here. He was given nitroglycerin which took the pain from a 1 out of 10 to almost a 0. Given his previous history, cardiology was consulted as well Dr. Li Weaver saw the patient here.   He will be admitted for serial enzymes, o

## 2021-04-19 NOTE — PROGRESS NOTES
04/19/21 1700 04/19/21 1709 04/19/21 1710   Vital Signs   Temp  --  97.8 °F (36.6 °C)  --    Temp src  --  Oral  --    Pulse  --  83 85   Heart Rate Source  --  Monitor Monitor   Cardiac Rhythm NSR  --   --    Resp  --  18 18   Respiratory Quality  --

## 2021-04-19 NOTE — H&P
[facility]    History and Physical     Peg Smolder Patient Status:  Emergency    1934 MRN [id number]   Location [address] [facility] [address] Attending Alexia Lan MD   [facility] Day # 0 PCP Paulino Domínguez MD     Chief Complaint: (12/12), negative ischemia.  echo (1/13), nl LVEF.    • Osteoarthritis    • Personal history of colonic polyps    • Spinal stenosis    • Stroke [facility]) 2017    TIA   • TIA (transient ischemic attack)    • Visual impairment     GLASSES        Past Surgical His for Wheezing., Disp: , Rfl:   atorvastatin 40 MG Oral Tab, Take 40 mg by mouth nightly., Disp: , Rfl:   Clopidogrel Bisulfate 75 MG Oral Tab, Take 75 mg by mouth daily. , Disp: , Rfl:   Metoprolol Succinate ER 25 MG Oral Tablet 24 Hr, Take 25 mg by mouth da intact  Musculoskeletal: Moves all extremities. Extremities: No edema or tenderness of the LE  Integument: No new rashes or lesions. Psychiatric: Appropriate mood and affect.       Diagnostic Data:      Labs:  Recent Labs   Lab 04/19/21  1015   WBC 13.2*

## 2021-04-19 NOTE — CONSULTS
[facility] Cardiology Consultation    Slick Jose Patient Status:  Emergency    1934 MRN [id number]   Location [address] [facility] Attending Carlee Harkins MD   Hosp Day # 0 PCP Boogie Collins MD     Reason for Consultation:  Chest impairment     GLASSES     Past Surgical History:   Procedure Laterality Date   • CABG     • COLONOSCOPY  11/17/06    normal (previous one revealed adenomatous poly)   • COLONOSCOPY  11/2012    Severe pan-colonic diverticulosis, 3 mm transverse adenoma, la carotids 2+ no bruits. Cardiac: Regular S1S2. No S3, S4, rub, click. No murmur. Lungs: Clear to auscultation and percussion. Abdomen: Soft, non-tender. Extremities: No LE edema.   No clubbing or cyanosis  Neurologic: Alert and oriented, normal affect

## 2021-04-20 ENCOUNTER — APPOINTMENT (OUTPATIENT)
Dept: CV DIAGNOSTICS | Facility: HOSPITAL | Age: 86
End: 2021-04-20
Attending: INTERNAL MEDICINE
Payer: MEDICARE

## 2021-04-20 PROCEDURE — 80048 BASIC METABOLIC PNL TOTAL CA: CPT | Performed by: HOSPITALIST

## 2021-04-20 PROCEDURE — 96372 THER/PROPH/DIAG INJ SC/IM: CPT

## 2021-04-20 PROCEDURE — 78452 HT MUSCLE IMAGE SPECT MULT: CPT | Performed by: INTERNAL MEDICINE

## 2021-04-20 PROCEDURE — 93018 CV STRESS TEST I&R ONLY: CPT | Performed by: INTERNAL MEDICINE

## 2021-04-20 PROCEDURE — 85025 COMPLETE CBC W/AUTO DIFF WBC: CPT | Performed by: HOSPITALIST

## 2021-04-20 PROCEDURE — 80061 LIPID PANEL: CPT | Performed by: HOSPITALIST

## 2021-04-20 PROCEDURE — 4A12XM4 MONITORING OF CARDIAC STRESS, EXTERNAL APPROACH: ICD-10-PCS | Performed by: RADIOLOGY

## 2021-04-20 PROCEDURE — 93017 CV STRESS TEST TRACING ONLY: CPT | Performed by: INTERNAL MEDICINE

## 2021-04-20 PROCEDURE — 94640 AIRWAY INHALATION TREATMENT: CPT

## 2021-04-20 PROCEDURE — 84484 ASSAY OF TROPONIN QUANT: CPT | Performed by: INTERNAL MEDICINE

## 2021-04-20 RX ORDER — DOCUSATE SODIUM 100 MG/1
100 CAPSULE, LIQUID FILLED ORAL 2 TIMES DAILY
Status: DISCONTINUED | OUTPATIENT
Start: 2021-04-20 | End: 2021-04-21

## 2021-04-20 RX ORDER — METOPROLOL SUCCINATE 50 MG/1
50 TABLET, EXTENDED RELEASE ORAL
Qty: 30 TABLET | Refills: 1 | Status: SHIPPED | OUTPATIENT
Start: 2021-04-20 | End: 2021-04-21

## 2021-04-20 RX ORDER — MAGNESIUM CARB/ALUMINUM HYDROX 105-160MG
296 TABLET,CHEWABLE ORAL ONCE
Status: COMPLETED | OUTPATIENT
Start: 2021-04-20 | End: 2021-04-20

## 2021-04-20 RX ORDER — POLYETHYLENE GLYCOL 3350 17 G/17G
17 POWDER, FOR SOLUTION ORAL DAILY PRN
Status: DISCONTINUED | OUTPATIENT
Start: 2021-04-20 | End: 2021-04-21

## 2021-04-20 NOTE — PROGRESS NOTES
04/20/21 1738   Clinical Encounter Type   Visited With Patient   Routine Visit Introduction   Hinduism Encounters   Hinduism Needs Prayer;Pastoral care brochure   Patient Spiritual Encounters   Spiritual Assessment Completed Yes   Spiritual Needs Mamadou Barahona

## 2021-04-20 NOTE — PROGRESS NOTES
[facility] Progress Note        Connor Steele Patient Status:  Observation    1934 MRN [id number]   [facility] 2NE-A Attending Annelise Matthew MD   Hosp Day # 0 PCP Juliane Alaniz MD     Subjective: °C)  Pulse:  [] 87  Resp:  [18-22] 18  BP: (115-168)/(64-87) 141/64      Temp: 98.2 °F (36.8 °C)  Pulse: 87  Resp: 18  BP: 141/64  General:  Appears comfortable            LABS:      HEM:  Recent Labs   Lab 04/19/21  1015 04/20/21  0604   WBC 13.2* 7

## 2021-04-20 NOTE — PROGRESS NOTES
[facility] Hospitalist Progress Note                                                                   [address] [facility]  11/9/1934    SUBJECTIVE: No chest pain, palpitations, shortness of breath, mg Oral Daily     Continuous Infusions:   PRN: PEG 3350, nitroGLYCERIN, Albuterol Sulfate HFA, acetaminophen, ondansetron HCl, Metoclopramide HCl    ASSESSMENT / PLAN:    80year old male with history of cad with CABG, asthma, anxiety, bph, tia, htn, hld,

## 2021-04-20 NOTE — PROGRESS NOTES
Pharmacy Note: Dietary Supplement Discontinuation Per Policy    Coenzyme M55 capsules has been discontinued on Doc Sparks per policy. This supplement may be restarted upon discharge using the medication reconciliation process.     Thank you,   Tiffany Bueno

## 2021-04-20 NOTE — PROGRESS NOTES
Patient completed 7 min 32 sec on a modified bandar protocol during the Nuc Stress Test.No cardiac symptoms were noted.  Max HR was 114 ( 85% AAMHR)

## 2021-04-20 NOTE — CONSULTS
Holiday Alber Patient Status:  Observation    1934 MRN [id number]   [facility] 2NE-A Attending Reji Teresa MD   [address] Day # 0 PCP Ryland Cui MD       Konstantin Oakland is a 80year old male for heartburn/GERD graded stress test (12/12), negative ischemia.  echo (1/13), nl LVEF.    • Osteoarthritis    • Personal history of colonic polyps    • Spinal stenosis    • Stroke [facility]) 2017    TIA   • TIA (transient ischemic attack)    • Visual impairment     GLASSES tenderness RECTAL: Exam not done. EXTREM. : no edema NEURO: A + O    ASSESSMENT AND PLAN:   Connor Steele is a 80year old male  with GERD/heartburn. Patient has a several day long history of symptoms. No alarm symptoms.   Heartburn anginal equivalent in

## 2021-04-21 VITALS
HEIGHT: 69 IN | DIASTOLIC BLOOD PRESSURE: 67 MMHG | BODY MASS INDEX: 24.98 KG/M2 | SYSTOLIC BLOOD PRESSURE: 125 MMHG | HEART RATE: 69 BPM | OXYGEN SATURATION: 99 % | TEMPERATURE: 98 F | WEIGHT: 168.63 LBS | RESPIRATION RATE: 17 BRPM

## 2021-04-21 PROCEDURE — 80048 BASIC METABOLIC PNL TOTAL CA: CPT | Performed by: HOSPITALIST

## 2021-04-21 PROCEDURE — 83735 ASSAY OF MAGNESIUM: CPT | Performed by: HOSPITALIST

## 2021-04-21 PROCEDURE — 85025 COMPLETE CBC W/AUTO DIFF WBC: CPT | Performed by: HOSPITALIST

## 2021-04-21 PROCEDURE — 0DJ08ZZ INSPECTION OF UPPER INTESTINAL TRACT, VIA NATURAL OR ARTIFICIAL OPENING ENDOSCOPIC: ICD-10-PCS | Performed by: INTERNAL MEDICINE

## 2021-04-21 PROCEDURE — 99152 MOD SED SAME PHYS/QHP 5/>YRS: CPT | Performed by: INTERNAL MEDICINE

## 2021-04-21 RX ORDER — MIDAZOLAM HYDROCHLORIDE 1 MG/ML
INJECTION INTRAMUSCULAR; INTRAVENOUS
Status: DISCONTINUED | OUTPATIENT
Start: 2021-04-21 | End: 2021-04-21 | Stop reason: HOSPADM

## 2021-04-21 RX ORDER — METOPROLOL SUCCINATE 25 MG/1
25 TABLET, EXTENDED RELEASE ORAL
Qty: 90 TABLET | Refills: 0 | Status: SHIPPED | OUTPATIENT
Start: 2021-04-21 | End: 2021-09-23

## 2021-04-21 RX ORDER — METOPROLOL SUCCINATE 25 MG/1
25 TABLET, EXTENDED RELEASE ORAL
Status: DISCONTINUED | OUTPATIENT
Start: 2021-04-21 | End: 2021-04-21

## 2021-04-21 NOTE — OPERATIVE REPORT
Holiday Alber Patient Status:  Observation    1934 MRN [id number]   Location [id number] [place] [address] 28 Attending Pita Boothe, [address] [facility] Day # 0 PCP Marino Florez MD         PATIENT NAME: Ced Storm OF

## 2021-04-21 NOTE — PLAN OF CARE
AO x4. Calm, cooperative. Up ad christen. NSR on cardiac monitor. Adequate saturation on room air. Lung sounds clear. Denies shortness of breath, chest discomfort, nausea/vomiting. Denies pain. Voiding without difficulty. Skin is clean, dry, and intact.  Patient
Patient deny pain walk around hallways on room air tolerated  activity well  Deny dyspnea   sinus rhythm on tele  Npo maint  Problem: GASTROINTESTINAL - ADULT  Goal: Maintains or returns to baseline bowel function  Description: INTERVENTIONS:  - Assess bow
Preliminary nuclear stress test results as called to me by Dr. Amalia Aguirre (radiology):    LVEF 65%, no reversible perfusion abnormalities. Fixed defect o apical 1/3 of septum. Reviewed with Dr. Adelita Gamez- no evidence of ischemia.  Stable CV status for EGD
Received bedside report on this Pt., at 7244, as Pt. , was up and walking, steady gait noted. Pt., A&Ox4, calm, pleasant and cooperative. Pt., is in SR on Tele monitor, sats greater than 92% on RA, denies any pain.  Pt., stated that his last BM was, Cary Salmeron
Received patient, alert and oriented. Denied chest pain, denied SOB. Up ad christen. Discussed POC. Due meds given. Reminded NPO P MN. Safety measures reinforced, call light within reach. Needs attended to. Will continue to monitor.      Problem: Patient/Family
Up and ambulating in the halls  Denies any pain, no sob  Stress test normal, per Nely Mccarty APN cleared for EGD tomorrow  Reminded npo post mn  Consent for EGD signed  Pt verbalized understanding poc      Problem: GASTROINTESTINAL - ADULT  Goal: Maintains or
no

## 2021-04-21 NOTE — PROGRESS NOTES
[facility] [address] [place] [address] Cardiology Progress Note        Vickie Contreras Patient Status:  Observation    1934 MRN [id number]   [facility] 2NE-A Attending Lee Ann Han MD   Hosp Day # 0 PCP Calin Vernon MD     Subjective: 112/58      Temp: 97.8 °F (36.6 °C)  Pulse: 74  Resp: 21  BP: 112/58  General:  Appears comfortable            LABS:      HEM:  Recent Labs   Lab 04/19/21  1015 04/20/21  0604 04/21/21  0642   WBC 13.2* 7.2 9.4   HGB 14.3 13.3 14.2   HCT 42.5 38.5* 41.0

## 2021-04-21 NOTE — BH PROGRESS NOTE
Patient tolerated soft diet  Discharge instructions given to patient w/ printed avs-verbalized understanding

## 2021-04-21 NOTE — BH PROGRESS NOTE
3222-Back from GI  Lab post EGD patient alert/oriented x4;   Follows commands  Deny pain deny dyspnea  Tele monitoring and    Tolerated clear liquids

## 2021-04-22 NOTE — DISCHARGE SUMMARY
General Medicine Discharge Summary     Patient ID:  Flavio Magana  80year old  11/9/1934    Admit date: 4/19/2021    Discharge date and time: 4/21/2021  4:36 PM     Attending Physician: Nasir Rosales Procedures: Procedure(s) (LRB):  ESOPHAGOGASTRODUODENOSCOPY (EGD) (N/A)       Patient instructions:      I reviewed and reconciled the discharge medications on the day of discharge.     Discharge Medication List as of 4/21/2021  3:45 PM    CONTINUE these me activity as tolerated  Diet: cardiac diet  Wound Care: as directed  Code Status: DNR      Exam on day of discharge:      04/21/21  1355   BP: 125/67   Pulse: 69   Resp: 17   Temp:        General: no acute distress, alert and oriented x 3  Heart: RRR  Lungs

## 2021-06-08 PROBLEM — L57.0 ACTINIC KERATOSIS: Status: ACTIVE | Noted: 2021-06-08

## 2021-06-08 PROBLEM — D04.61: Status: ACTIVE | Noted: 2020-10-31

## 2021-11-15 PROBLEM — I20.0 UNSTABLE ANGINA (HCC): Status: RESOLVED | Noted: 2021-04-19 | Resolved: 2021-11-15

## 2021-11-15 PROBLEM — D68.69 SECONDARY HYPERCOAGULABLE STATE (HCC): Status: ACTIVE | Noted: 2021-11-15

## 2021-11-15 PROBLEM — G31.9 BRAIN ATROPHY (HCC): Status: ACTIVE | Noted: 2021-11-15

## 2021-11-15 PROBLEM — H04.123 DRY EYE SYNDROME OF BILATERAL LACRIMAL GLANDS: Status: ACTIVE | Noted: 2021-10-12

## 2021-11-15 PROBLEM — L57.0 ACTINIC KERATOSIS: Status: RESOLVED | Noted: 2021-06-08 | Resolved: 2021-11-15

## 2021-11-15 PROBLEM — I48.0 PAF (PAROXYSMAL ATRIAL FIBRILLATION) (HCC): Status: ACTIVE | Noted: 2017-06-06

## 2022-01-24 PROBLEM — D04.5 SQUAMOUS CELL CARCINOMA IN SITU OF SKIN OF BACK: Status: ACTIVE | Noted: 2022-01-24

## 2022-11-20 ENCOUNTER — HOSPITAL ENCOUNTER (EMERGENCY)
Facility: HOSPITAL | Age: 87
Discharge: HOME | End: 2022-11-20
Attending: EMERGENCY MEDICINE
Payer: MEDICARE

## 2022-11-20 ENCOUNTER — APPOINTMENT (OUTPATIENT)
Dept: CT IMAGING | Facility: HOSPITAL | Age: 87
End: 2022-11-20
Attending: EMERGENCY MEDICINE
Payer: MEDICARE

## 2022-11-20 ENCOUNTER — APPOINTMENT (OUTPATIENT)
Dept: GENERAL RADIOLOGY | Facility: HOSPITAL | Age: 87
End: 2022-11-20
Attending: EMERGENCY MEDICINE
Payer: MEDICARE

## 2022-11-20 VITALS
OXYGEN SATURATION: 96 % | RESPIRATION RATE: 15 BRPM | HEART RATE: 80 BPM | SYSTOLIC BLOOD PRESSURE: 140 MMHG | WEIGHT: 167 LBS | TEMPERATURE: 98 F | BODY MASS INDEX: 26.21 KG/M2 | DIASTOLIC BLOOD PRESSURE: 83 MMHG | HEIGHT: 67 IN

## 2022-11-20 DIAGNOSIS — N39.0 URINARY TRACT INFECTION WITHOUT HEMATURIA, SITE UNSPECIFIED: ICD-10-CM

## 2022-11-20 DIAGNOSIS — I10 ASYMPTOMATIC HYPERTENSION: Primary | ICD-10-CM

## 2022-11-20 LAB
ALBUMIN SERPL-MCNC: 3.7 G/DL (ref 3.4–5)
ALBUMIN/GLOB SERPL: 1.1 {RATIO} (ref 1–2)
ALP LIVER SERPL-CCNC: 107 U/L
ALT SERPL-CCNC: 31 U/L
ANION GAP SERPL CALC-SCNC: 6 MMOL/L (ref 0–18)
AST SERPL-CCNC: 19 U/L (ref 15–37)
ATRIAL RATE: 82 BPM
BASOPHILS # BLD AUTO: 0.03 X10(3) UL (ref 0–0.2)
BASOPHILS NFR BLD AUTO: 0.4 %
BILIRUB SERPL-MCNC: 0.7 MG/DL (ref 0.1–2)
BILIRUB UR QL STRIP.AUTO: NEGATIVE
BUN BLD-MCNC: 14 MG/DL (ref 7–18)
CALCIUM BLD-MCNC: 9 MG/DL (ref 8.5–10.1)
CHLORIDE SERPL-SCNC: 101 MMOL/L (ref 98–112)
CLARITY UR REFRACT.AUTO: CLEAR
CO2 SERPL-SCNC: 23 MMOL/L (ref 21–32)
CREAT BLD-MCNC: 1.02 MG/DL
EOSINOPHIL # BLD AUTO: 0.16 X10(3) UL (ref 0–0.7)
EOSINOPHIL NFR BLD AUTO: 1.9 %
ERYTHROCYTE [DISTWIDTH] IN BLOOD BY AUTOMATED COUNT: 12.3 %
GFR SERPLBLD BASED ON 1.73 SQ M-ARVRAT: 71 ML/MIN/1.73M2 (ref 60–?)
GLOBULIN PLAS-MCNC: 3.3 G/DL (ref 2.8–4.4)
GLUCOSE BLD-MCNC: 131 MG/DL (ref 70–99)
GLUCOSE UR STRIP.AUTO-MCNC: NEGATIVE MG/DL
HCT VFR BLD AUTO: 38.5 %
HGB BLD-MCNC: 13.2 G/DL
IMM GRANULOCYTES # BLD AUTO: 0.02 X10(3) UL (ref 0–1)
IMM GRANULOCYTES NFR BLD: 0.2 %
KETONES UR STRIP.AUTO-MCNC: NEGATIVE MG/DL
LYMPHOCYTES # BLD AUTO: 0.76 X10(3) UL (ref 1–4)
LYMPHOCYTES NFR BLD AUTO: 8.9 %
MCH RBC QN AUTO: 31.1 PG (ref 26–34)
MCHC RBC AUTO-ENTMCNC: 34.3 G/DL (ref 31–37)
MCV RBC AUTO: 90.8 FL
MONOCYTES # BLD AUTO: 0.68 X10(3) UL (ref 0.1–1)
MONOCYTES NFR BLD AUTO: 7.9 %
NEUTROPHILS # BLD AUTO: 6.92 X10 (3) UL (ref 1.5–7.7)
NEUTROPHILS # BLD AUTO: 6.92 X10(3) UL (ref 1.5–7.7)
NEUTROPHILS NFR BLD AUTO: 80.7 %
NITRITE UR QL STRIP.AUTO: NEGATIVE
OSMOLALITY SERPL CALC.SUM OF ELEC: 272 MOSM/KG (ref 275–295)
P AXIS: 27 DEGREES
P-R INTERVAL: 158 MS
PH UR STRIP.AUTO: 7 [PH] (ref 5–8)
PLATELET # BLD AUTO: 232 10(3)UL (ref 150–450)
POTASSIUM SERPL-SCNC: 4 MMOL/L (ref 3.5–5.1)
PROT SERPL-MCNC: 7 G/DL (ref 6.4–8.2)
PROT UR STRIP.AUTO-MCNC: NEGATIVE MG/DL
Q-T INTERVAL: 376 MS
QRS DURATION: 84 MS
QTC CALCULATION (BEZET): 439 MS
R AXIS: -7 DEGREES
RBC # BLD AUTO: 4.24 X10(6)UL
RBC UR QL AUTO: NEGATIVE
SARS-COV-2 RNA RESP QL NAA+PROBE: NOT DETECTED
SODIUM SERPL-SCNC: 130 MMOL/L (ref 136–145)
SP GR UR STRIP.AUTO: 1 (ref 1–1.03)
T AXIS: 10 DEGREES
TROPONIN I HIGH SENSITIVITY: 9 NG/L
UROBILINOGEN UR STRIP.AUTO-MCNC: <2 MG/DL
VENTRICULAR RATE: 82 BPM
WBC # BLD AUTO: 8.6 X10(3) UL (ref 4–11)

## 2022-11-20 PROCEDURE — 87086 URINE CULTURE/COLONY COUNT: CPT | Performed by: EMERGENCY MEDICINE

## 2022-11-20 PROCEDURE — 85025 COMPLETE CBC W/AUTO DIFF WBC: CPT | Performed by: EMERGENCY MEDICINE

## 2022-11-20 PROCEDURE — 84484 ASSAY OF TROPONIN QUANT: CPT | Performed by: EMERGENCY MEDICINE

## 2022-11-20 PROCEDURE — 80053 COMPREHEN METABOLIC PANEL: CPT | Performed by: EMERGENCY MEDICINE

## 2022-11-20 PROCEDURE — 99284 EMERGENCY DEPT VISIT MOD MDM: CPT

## 2022-11-20 PROCEDURE — 81001 URINALYSIS AUTO W/SCOPE: CPT | Performed by: EMERGENCY MEDICINE

## 2022-11-20 PROCEDURE — 36415 COLL VENOUS BLD VENIPUNCTURE: CPT

## 2022-11-20 PROCEDURE — 70450 CT HEAD/BRAIN W/O DYE: CPT | Performed by: EMERGENCY MEDICINE

## 2022-11-20 PROCEDURE — 71045 X-RAY EXAM CHEST 1 VIEW: CPT | Performed by: EMERGENCY MEDICINE

## 2022-11-20 PROCEDURE — 93010 ELECTROCARDIOGRAM REPORT: CPT

## 2022-11-20 PROCEDURE — 99285 EMERGENCY DEPT VISIT HI MDM: CPT

## 2022-11-20 PROCEDURE — 93005 ELECTROCARDIOGRAM TRACING: CPT

## 2022-11-20 RX ORDER — SULFAMETHOXAZOLE AND TRIMETHOPRIM 800; 160 MG/1; MG/1
1 TABLET ORAL ONCE
Status: COMPLETED | OUTPATIENT
Start: 2022-11-20 | End: 2022-11-20

## 2022-11-20 RX ORDER — SULFAMETHOXAZOLE AND TRIMETHOPRIM 800; 160 MG/1; MG/1
1 TABLET ORAL 2 TIMES DAILY
Qty: 20 TABLET | Refills: 0 | Status: SHIPPED | OUTPATIENT
Start: 2022-11-20 | End: 2022-11-30

## 2022-11-20 NOTE — ED INITIAL ASSESSMENT (HPI)
Patient arrives with c/o hypertension at home. States /96 at home. Also had the high bp yesterday. States yesterday took his medication and BP came down but today there was no change in BP. States feeling lightheaded but denies any dyspnea, N/V/D or pain.

## 2024-05-31 RX ORDER — FLUTICASONE PROPIONATE AND SALMETEROL 250; 50 UG/1; UG/1
1 POWDER RESPIRATORY (INHALATION) 2 TIMES DAILY
COMMUNITY

## 2024-05-31 RX ORDER — TAMSULOSIN HYDROCHLORIDE 0.4 MG/1
0.4 CAPSULE ORAL DAILY
COMMUNITY

## 2024-05-31 RX ORDER — PHENOL 1.4 %
600 AEROSOL, SPRAY (ML) MUCOUS MEMBRANE 2 TIMES DAILY
COMMUNITY

## 2024-05-31 NOTE — PAT NURSING NOTE
PAT call with patient and his wife. He dose not use My Chart. The following instructions were given, questions answered and they verbalized understanding.    PreOp Instructions    You are scheduled for: a Cardiac Procedure    Date of Procedure: 06/07/24    Skin Prep: Shower with antibacterial soap using a clean washcloth, prior to procedure    Arrival Time: The day prior to your procedure you will receive a phone call before 6:00 pm with your arrival time. If you haven't received a phone call, please check your voicemail messages., If you did not receive a voice mail and it is after 6:00 pm, please call the nursing supervisor at [phone].    Driving After Procedure: No Sedation - You may drive self home    Discharge Teaching: Your nurse will give you specific instructions before discharge, Any questions, please call the physician's office       parking is available starting at 6 am or park in the [place] parking garage at [facility]. Check in at the [facility] reception desk. Our  will be there to check you in for your procedure. Please bring your insurance cards and ID with you.                                                                                                                                      Please DO NOT respond to this message, the inbasket is not monitored for messages. For any questions, please call the physician's office.

## 2024-06-07 ENCOUNTER — HOSPITAL ENCOUNTER (OUTPATIENT)
Dept: INTERVENTIONAL RADIOLOGY/VASCULAR | Facility: HOSPITAL | Age: 89
Discharge: HOME | End: 2024-06-07
Attending: INTERNAL MEDICINE | Admitting: INTERNAL MEDICINE
Payer: MEDICARE

## 2024-06-07 VITALS
HEART RATE: 68 BPM | WEIGHT: 165 LBS | SYSTOLIC BLOOD PRESSURE: 118 MMHG | HEIGHT: 67 IN | DIASTOLIC BLOOD PRESSURE: 72 MMHG | OXYGEN SATURATION: 95 % | RESPIRATION RATE: 11 BRPM | BODY MASS INDEX: 25.9 KG/M2 | TEMPERATURE: 98 F

## 2024-06-07 DIAGNOSIS — Z45.09 ENCOUNTER FOR LOOP RECORDER AT END OF BATTERY LIFE: ICD-10-CM

## 2024-06-07 PROCEDURE — 33286 RMVL SUBQ CAR RHYTHM MNTR: CPT | Performed by: INTERNAL MEDICINE

## 2024-06-07 PROCEDURE — 0JPT32Z REMOVAL OF MONITORING DEVICE FROM TRUNK SUBCUTANEOUS TISSUE AND FASCIA, PERCUTANEOUS APPROACH: ICD-10-PCS | Performed by: INTERNAL MEDICINE

## 2024-06-07 RX ORDER — CHLORHEXIDINE GLUCONATE 40 MG/ML
SOLUTION TOPICAL
Status: DISCONTINUED | OUTPATIENT
Start: 2024-06-08 | End: 2024-06-07

## 2024-06-07 RX ORDER — LIDOCAINE HYDROCHLORIDE AND EPINEPHRINE BITARTRATE 20; .01 MG/ML; MG/ML
INJECTION, SOLUTION SUBCUTANEOUS
Status: COMPLETED
Start: 2024-06-07 | End: 2024-06-07

## 2024-06-07 NOTE — PLAN OF CARE
Patient here today to see Dr. Boston and have loop recorder removed. Patient tolerated procedure. Mepilex dressing in place, CDI. Discharge instructions reviewed. All questions answered. Patient verbalizes understanding. Patient discharged to [facility] with belongings.

## 2024-06-07 NOTE — DISCHARGE INSTRUCTIONS
Leave dressing on and dry for 5 days. On day 5, remove the dressing and you can then shower.  Call Dr. Boston's office with any signs and symptoms of infection.

## 2024-06-07 NOTE — H&P
89 year old male    Had confusion and word finding difficulty when in [place] 9/2020. Given tPA. Symtpoms resolved. Not all records available    Had TIA 2018 as well    Has climbed over 35 fourteeners    As of 8/24/2021,  Had LINQ placed  Had episode in July of 2 minutes of atrial fibrillation   He has had about 6 TIAs!  He is very active as usual    As of 5/6/2022,  Started on anticoagulation but has had recurrent nosebleeds despite cautery  Stopped eliquis and had 2 episodes of epistaxis but milder. None in the last few weeks  Feeling otherwise ok. BP is good at home    As of 5/5/2023,  Prostate CA with bone mets diagnosed 6/2023. On lupron  On LINQ, had 4 hours of atrial fibrillation. No symptoms    As of 5/10/2024,  Monitoring has been stopped on loop recorder. At EOS  He feels ok    As of 6/7/2024   Here for loop removal    Denies chest pain, shortness of breath, palpitations, lightheadedness, syncope, orthopnea, paroxysmal nocturnal dyspnea, or edema.    Past Medical History:  coronary artery disease s/p CABG 2017  paroxysmal atrial fibrillation s/p JOSEPH clip 2017  Hypertension   TIAs    Medications:  Current Outpatient Medications:   ciprofloxacin (CIPRO) 500 MG Oral Tab, Take 1 tablet (500 mg total) by mouth 2 (two) times daily., Disp: 28 tablet, Rfl: 0  azithromycin (ZITHROMAX Z-PATRICIA) 250 MG Oral Tab, Take two tablets today, then one tablet daily for 4 more days, Disp: 6 tablet, Rfl: 0  predniSONE 10 MG Oral Tab, Three tabs daily x 3 days then two tablets daily x 3 days then one tablet daily x 3 days then stop, Disp: 18 tablet, Rfl: 1  amLODIPine 5 MG Oral Tab, Take 1 tablet (5 mg total) by mouth daily., Disp: 90 tablet, Rfl: 3  lisinopril 20 MG Oral Tab, Take 1 tablet (20 mg total) by mouth daily., Disp: 90 tablet, Rfl: 0  clotrimazole-betamethasone 1-0.05 % External Cream, APPLY TO AFFECTED 3 TIMES A DAY FOR 14 DAYS, Disp: 45 g, Rfl: 3  nitrofurantoin monohydrate macro (MACROBID) 100 MG Oral Cap, Take 1  capsule (100 mg total) by mouth 2 (two) times daily for 7 days., Disp: 14 capsule, Rfl: 0  tamsulosin (Flomax) cap, Take 1 capsule (0.4 mg total) by mouth daily., Disp: 90 capsule, Rfl: 3  metoprolol succinate ER 25 MG Oral Tablet 24 Hr, TAKE 1 TABLET BY MOUTH TWICE A DAY, Disp: 180 tablet, Rfl: 3  AMLODIPINE 2.5 MG Oral Tab, TAKE 1 TABLET BY MOUTH AT BEDTIME., Disp: 90 tablet, Rfl: 3  Fluticasone-Salmeterol 250-50 MCG/ACT Inhalation Aerosol Powder, Breath Activated, Inhale 1 puff into the lungs 2 (two) times a day., Disp: 1 each, Rfl: 3  Calcium Carbonate 600 MG Oral Tab, Take 600 mg by mouth 2 (two) times a day., Disp: , Rfl:   Leuprolide Acetate, 6 Month, (LUPRON DEPOT, 6-MONTH, IM), Inject into the muscle. Takes every 6 months, Disp: , Rfl:   Denosumab 60 MG/ML Subcutaneous Solution Prefilled Syringe, Inject 60 mg into the skin every 6 (six) months., Disp: , Rfl:   atorvastatin 40 MG Oral Tab, TAKE 1 TABLET BY MOUTH EVERY DAY, Disp: 90 tablet, Rfl: 3  albuterol 108 (90 Base) MCG/ACT Inhalation Aero Soln, INHALE 1 TO 2 PUFFS BY MOUTH INTO THE LUNGS EVERY 4 HOURS AS NEEDED FOR WHEEZING, Disp: 18 g, Rfl: 3  amLODIPine 5 MG Oral Tab, Take 1 tablet (5 mg total) by mouth daily., Disp: 90 tablet, Rfl: 2  Melatonin 10 MG Oral Tab, Take 20 mg by mouth., Disp: , Rfl:   Multiple Vitamins-Minerals (OCUVITE EXTRA) Oral Tab, Take 1 tablet by mouth daily., Disp: 30 tablet, Rfl: 0  Coenzyme Q10 (COQ10) 30 MG Oral Cap, Take 1 capsule by mouth daily., Disp: 30 capsule, Rfl: 0    Physical:  /74  Pulse 78  Ht 5' 7\" (1.702 m)  Wt 162 lb (73.5 kg)  BMI 25.37 kg/m²   GENERAL: well developed, well nourished, in no apparent distress  EYES: sclera anicteric  HEENT: normocephalic  NECK: no JVD, no carotid bruits, no thyromegaly  RESPIRATORY: clear to auscultation  CARDIOVASCULAR: S1, S2 normal, RRR; no S3, no S4; no click; no murmur  ABDOMEN: normal active BS, soft, nondistended; nontender  EXTREMITIES: no cyanosis, clubbing or  edema, peripheral pulses intact  SKIN: left device site normal     Data:  EC/10/2024: SR 78  EC2023: SR 77  EC2022: SR 85  EC: SR 95  Echo: 2020: EF 55-60 small PFO, interatrial septal aneurysm  LINQ reviewed: 4 hours of atrial fibrillation     Impression:  paroxysmal atrial fibrillation s/p JOSEPH clip 2017  Recurrent TIAs  coronary artery disease s/p CABG    Plan:  He has paroxysmal atrial fibrillation but he has JOSEPH clip  Given recurrent bleeding we've decided just to start asa. (He is not on any antiplatelets now)  Discussed options: leave in loop recorder or remove. He wants to remove it for psychological reasons. The risks (including, but not limited to, hematoma, infection), benefits (removal of hardware), and alternatives (leave in) of the procedure were discussed. The patient understands and agrees to proceed.

## 2024-06-08 NOTE — PROCEDURES
Loop Recorder Removal      History:  89 year old male with a h/o TIAs s/p loop recorder now at EOS  who presents for its removal. The risks and benefits of the procedure (including, but not limited to, hematoma and infection) were discussed. The patient understands and agrees to proceed.    Procedure:  The patient was admitted to the invasive lab holding unit. The left chest was prepped and draped in the usual sterile fashion. 1% lidocaine with epinephrine was used for skin anesthesia. A stab incision was made over the old device and it was removed. The wound was dressed.    Device data:           Model Number Serial Number   Loop Recorder [place] [employer] [id number] [id number]       Conclusions:  Successful loop recorder removal      Bruno Boston MD  Clinical Cardiac Electrophysiology  [facility]

## 2024-08-21 NOTE — LETTER
Patient Name: Dada Stockton DOB-Age / Sex: 1934-A: 89 y  male   Medical Records: [id number] CSN: [id number]    DNAR NOTIFICATION    Your patient listed above has a procedure scheduled at [facility] and has indicated that he/she currently has a DNAR (Do Not Attempt Resuscitattion) with their Advanced Directives.    Please note that you will be asked to address this matter with your patient pre-operatively.  Thank you.      Procedure Date 10/11/2024  Procedure CYSTOSCOPY TRANSURETHRAL RESECTION PROSTATE, SUPRAPUBIC CATHETER PLACEMENT, N/A    Surgeon(s):  Margret Lake MD   home

## 2024-09-16 ENCOUNTER — HOSPITAL ENCOUNTER (INPATIENT)
Age: 89
LOS: 2 days | Discharge: NURSING, REHAB OR LONG-TERM CARE | DRG: 723 | End: 2024-09-19
Attending: EMERGENCY MEDICINE | Admitting: INTERNAL MEDICINE

## 2024-09-16 DIAGNOSIS — C61 MALIGNANT NEOPLASM OF PROSTATE  (CMD): ICD-10-CM

## 2024-09-16 DIAGNOSIS — R31.0 GROSS HEMATURIA: Primary | ICD-10-CM

## 2024-09-16 LAB
ANION GAP SERPL CALC-SCNC: 14 MMOL/L (ref 7–19)
APPEARANCE UR: ABNORMAL
BACTERIA #/AREA URNS HPF: ABNORMAL /HPF
BASOPHILS # BLD: 0 K/MCL (ref 0–0.3)
BASOPHILS NFR BLD: 0 %
BILIRUB UR QL STRIP: NEGATIVE
BUN SERPL-MCNC: 17 MG/DL (ref 6–20)
BUN/CREAT SERPL: 15 (ref 7–25)
CALCIUM SERPL-MCNC: 7.8 MG/DL (ref 8.4–10.2)
CHLORIDE SERPL-SCNC: 104 MMOL/L (ref 97–110)
CO2 SERPL-SCNC: 19 MMOL/L (ref 21–32)
COLOR UR: ABNORMAL
CREAT SERPL-MCNC: 1.1 MG/DL (ref 0.67–1.17)
DEPRECATED RDW RBC: 38.8 FL (ref 39–50)
EGFRCR SERPLBLD CKD-EPI 2021: 64 ML/MIN/{1.73_M2}
EOSINOPHIL # BLD: 0.4 K/MCL (ref 0–0.5)
EOSINOPHIL NFR BLD: 4 %
ERYTHROCYTE [DISTWIDTH] IN BLOOD: 11.8 % (ref 11–15)
FASTING DURATION TIME PATIENT: ABNORMAL H
GLUCOSE SERPL-MCNC: 105 MG/DL (ref 70–99)
GLUCOSE UR STRIP-MCNC: NEGATIVE MG/DL
HCT VFR BLD CALC: 35.4 % (ref 39–51)
HGB BLD-MCNC: 11.3 G/DL (ref 13–17)
HGB BLD-MCNC: 12.1 G/DL (ref 13–17)
HGB UR QL STRIP: ABNORMAL
HYALINE CASTS #/AREA URNS LPF: ABNORMAL /LPF
IMM GRANULOCYTES # BLD AUTO: 0 K/MCL (ref 0–0.2)
IMM GRANULOCYTES # BLD: 0 %
KETONES UR STRIP-MCNC: ABNORMAL MG/DL
LEUKOCYTE ESTERASE UR QL STRIP: ABNORMAL
LYMPHOCYTES # BLD: 1.3 K/MCL (ref 1–4)
LYMPHOCYTES NFR BLD: 13 %
MCH RBC QN AUTO: 30.6 PG (ref 26–34)
MCHC RBC AUTO-ENTMCNC: 34.2 G/DL (ref 32–36.5)
MCV RBC AUTO: 89.6 FL (ref 78–100)
MONOCYTES # BLD: 1.1 K/MCL (ref 0.3–0.9)
MONOCYTES NFR BLD: 11 %
NEUTROPHILS # BLD: 7.2 K/MCL (ref 1.8–7.7)
NEUTROPHILS NFR BLD: 72 %
NITRITE UR QL STRIP: NEGATIVE
NRBC BLD MANUAL-RTO: 0 /100 WBC
PH UR STRIP: 7.5 [PH] (ref 5–7)
PLATELET # BLD AUTO: 303 K/MCL (ref 140–450)
POTASSIUM SERPL-SCNC: 4.7 MMOL/L (ref 3.4–5.1)
PROT UR STRIP-MCNC: 100 MG/DL
RAINBOW EXTRA TUBES HOLD SPECIMEN: NORMAL
RBC # BLD: 3.95 MIL/MCL (ref 4.5–5.9)
RBC #/AREA URNS HPF: >100 /HPF
SODIUM SERPL-SCNC: 132 MMOL/L (ref 135–145)
SP GR UR STRIP: 1.02 (ref 1–1.03)
SQUAMOUS #/AREA URNS HPF: ABNORMAL /HPF
UROBILINOGEN UR STRIP-MCNC: 0.2 MG/DL
WBC # BLD: 10.1 K/MCL (ref 4.2–11)
WBC #/AREA URNS HPF: ABNORMAL /HPF

## 2024-09-16 PROCEDURE — 10002803 HB RX 637: Performed by: INTERNAL MEDICINE

## 2024-09-16 PROCEDURE — 51700 IRRIGATION OF BLADDER: CPT

## 2024-09-16 PROCEDURE — 81001 URINALYSIS AUTO W/SCOPE: CPT | Performed by: EMERGENCY MEDICINE

## 2024-09-16 PROCEDURE — 10004651 HB RX, NO CHARGE ITEM: Performed by: INTERNAL MEDICINE

## 2024-09-16 PROCEDURE — 85018 HEMOGLOBIN: CPT | Performed by: INTERNAL MEDICINE

## 2024-09-16 PROCEDURE — 80048 BASIC METABOLIC PNL TOTAL CA: CPT | Performed by: EMERGENCY MEDICINE

## 2024-09-16 PROCEDURE — 10002807 HB RX 258: Performed by: INTERNAL MEDICINE

## 2024-09-16 PROCEDURE — 85025 COMPLETE CBC W/AUTO DIFF WBC: CPT | Performed by: EMERGENCY MEDICINE

## 2024-09-16 PROCEDURE — 99284 EMERGENCY DEPT VISIT MOD MDM: CPT

## 2024-09-16 PROCEDURE — G0378 HOSPITAL OBSERVATION PER HR: HCPCS

## 2024-09-16 PROCEDURE — 36415 COLL VENOUS BLD VENIPUNCTURE: CPT | Performed by: INTERNAL MEDICINE

## 2024-09-16 PROCEDURE — 10002800 HB RX 250 W HCPCS: Performed by: INTERNAL MEDICINE

## 2024-09-16 RX ORDER — AMLODIPINE BESYLATE 2.5 MG/1
2.5 TABLET ORAL DAILY
COMMUNITY

## 2024-09-16 RX ORDER — SODIUM CHLORIDE 9 MG/ML
INJECTION, SOLUTION INTRAVENOUS CONTINUOUS
Status: DISCONTINUED | OUTPATIENT
Start: 2024-09-16 | End: 2024-09-18

## 2024-09-16 RX ORDER — ATORVASTATIN CALCIUM 40 MG/1
40 TABLET, FILM COATED ORAL DAILY
Status: DISCONTINUED | OUTPATIENT
Start: 2024-09-16 | End: 2024-09-19 | Stop reason: HOSPADM

## 2024-09-16 RX ORDER — VIBEGRON 75 MG/1
75 TABLET, FILM COATED ORAL DAILY
COMMUNITY

## 2024-09-16 RX ORDER — AMLODIPINE BESYLATE 5 MG/1
5 TABLET ORAL DAILY
COMMUNITY

## 2024-09-16 RX ORDER — POLYETHYLENE GLYCOL 3350 17 G/17G
17 POWDER, FOR SOLUTION ORAL DAILY PRN
Status: DISCONTINUED | OUTPATIENT
Start: 2024-09-16 | End: 2024-09-19 | Stop reason: HOSPADM

## 2024-09-16 RX ORDER — DIAZEPAM 2 MG
2 TABLET ORAL NIGHTLY PRN
Status: DISCONTINUED | OUTPATIENT
Start: 2024-09-16 | End: 2024-09-19 | Stop reason: HOSPADM

## 2024-09-16 RX ORDER — METOPROLOL SUCCINATE 25 MG/1
25 TABLET, EXTENDED RELEASE ORAL DAILY
Status: DISCONTINUED | OUTPATIENT
Start: 2024-09-16 | End: 2024-09-17

## 2024-09-16 RX ORDER — LISINOPRIL 20 MG/1
20 TABLET ORAL DAILY
COMMUNITY

## 2024-09-16 RX ORDER — SODIUM CHLORIDE 9 MG/ML
INJECTION, SOLUTION INTRAVENOUS CONTINUOUS
Status: DISCONTINUED | OUTPATIENT
Start: 2024-09-16 | End: 2024-09-16

## 2024-09-16 RX ORDER — 0.9 % SODIUM CHLORIDE 0.9 %
2 VIAL (ML) INJECTION EVERY 12 HOURS SCHEDULED
Status: DISCONTINUED | OUTPATIENT
Start: 2024-09-16 | End: 2024-09-19 | Stop reason: HOSPADM

## 2024-09-16 RX ORDER — PHENAZOPYRIDINE HYDROCHLORIDE 200 MG/1
100 TABLET, FILM COATED ORAL ONCE
Status: DISCONTINUED | OUTPATIENT
Start: 2024-09-16 | End: 2024-09-19 | Stop reason: HOSPADM

## 2024-09-16 RX ORDER — AMLODIPINE BESYLATE 5 MG/1
5 TABLET ORAL DAILY
Status: DISCONTINUED | OUTPATIENT
Start: 2024-09-16 | End: 2024-09-19 | Stop reason: HOSPADM

## 2024-09-16 RX ORDER — DIAZEPAM 2 MG
2 TABLET ORAL NIGHTLY PRN
COMMUNITY

## 2024-09-16 RX ORDER — HYDROCODONE BITARTRATE AND ACETAMINOPHEN 5; 325 MG/1; MG/1
1 TABLET ORAL EVERY 4 HOURS PRN
Status: DISCONTINUED | OUTPATIENT
Start: 2024-09-16 | End: 2024-09-19 | Stop reason: HOSPADM

## 2024-09-16 RX ORDER — ONDANSETRON 4 MG/1
4 TABLET, ORALLY DISINTEGRATING ORAL EVERY 12 HOURS PRN
Status: DISCONTINUED | OUTPATIENT
Start: 2024-09-16 | End: 2024-09-16

## 2024-09-16 RX ORDER — ATORVASTATIN CALCIUM 40 MG/1
40 TABLET, FILM COATED ORAL DAILY
COMMUNITY

## 2024-09-16 RX ORDER — ONDANSETRON 4 MG/1
4 TABLET, ORALLY DISINTEGRATING ORAL EVERY 12 HOURS PRN
Status: DISCONTINUED | OUTPATIENT
Start: 2024-09-16 | End: 2024-09-19 | Stop reason: HOSPADM

## 2024-09-16 RX ORDER — ACETAMINOPHEN 325 MG/1
650 TABLET ORAL EVERY 4 HOURS PRN
Status: DISCONTINUED | OUTPATIENT
Start: 2024-09-16 | End: 2024-09-19 | Stop reason: HOSPADM

## 2024-09-16 RX ORDER — LIDOCAINE HYDROCHLORIDE 20 MG/ML
JELLY TOPICAL
Status: DISPENSED
Start: 2024-09-16 | End: 2024-09-16

## 2024-09-16 RX ORDER — AMLODIPINE BESYLATE 5 MG/1
2.5 TABLET ORAL DAILY
Status: DISCONTINUED | OUTPATIENT
Start: 2024-09-16 | End: 2024-09-19 | Stop reason: HOSPADM

## 2024-09-16 RX ORDER — GABAPENTIN 300 MG/1
600 CAPSULE ORAL AT BEDTIME
Status: DISCONTINUED | OUTPATIENT
Start: 2024-09-16 | End: 2024-09-19 | Stop reason: HOSPADM

## 2024-09-16 RX ORDER — GABAPENTIN 300 MG/1
600 CAPSULE ORAL AT BEDTIME
COMMUNITY

## 2024-09-16 RX ORDER — METOPROLOL SUCCINATE 25 MG/1
25 TABLET, EXTENDED RELEASE ORAL DAILY
COMMUNITY

## 2024-09-16 RX ORDER — ONDANSETRON 2 MG/ML
4 INJECTION INTRAMUSCULAR; INTRAVENOUS EVERY 12 HOURS PRN
Status: DISCONTINUED | OUTPATIENT
Start: 2024-09-16 | End: 2024-09-19 | Stop reason: HOSPADM

## 2024-09-16 RX ADMIN — GABAPENTIN 600 MG: 300 CAPSULE ORAL at 22:09

## 2024-09-16 RX ADMIN — AMLODIPINE BESYLATE 2.5 MG: 5 TABLET ORAL at 17:50

## 2024-09-16 RX ADMIN — PIPERACILLIN AND TAZOBACTAM 3.38 G: 3; .375 INJECTION, POWDER, FOR SOLUTION INTRAVENOUS at 13:44

## 2024-09-16 RX ADMIN — SODIUM CHLORIDE: 9 INJECTION, SOLUTION INTRAVENOUS at 22:35

## 2024-09-16 RX ADMIN — PIPERACILLIN SODIUM AND TAZOBACTAM SODIUM 3.38 G: 3; .375 INJECTION, POWDER, FOR SOLUTION INTRAVENOUS at 22:17

## 2024-09-16 RX ADMIN — SODIUM CHLORIDE: 9 INJECTION, SOLUTION INTRAVENOUS at 09:47

## 2024-09-16 RX ADMIN — ATORVASTATIN CALCIUM 40 MG: 40 TABLET, FILM COATED ORAL at 17:50

## 2024-09-16 RX ADMIN — METOPROLOL SUCCINATE 25 MG: 25 TABLET, EXTENDED RELEASE ORAL at 17:50

## 2024-09-16 RX ADMIN — AMLODIPINE BESYLATE 5 MG: 5 TABLET ORAL at 17:51

## 2024-09-16 RX ADMIN — SODIUM CHLORIDE, PRESERVATIVE FREE 2 ML: 5 INJECTION INTRAVENOUS at 22:18

## 2024-09-16 SDOH — ECONOMIC STABILITY: GENERAL

## 2024-09-16 SDOH — SOCIAL STABILITY: SOCIAL NETWORK: SUPPORT SYSTEMS: FAMILY MEMBERS;SPOUSE

## 2024-09-16 SDOH — SOCIAL STABILITY: SOCIAL INSECURITY: HOW OFTEN DOES ANYONE, INCLUDING FAMILY AND FRIENDS, PHYSICALLY HURT YOU?: NEVER

## 2024-09-16 SDOH — SOCIAL STABILITY: SOCIAL INSECURITY: HOW OFTEN DOES ANYONE, INCLUDING FAMILY AND FRIENDS, SCREAM OR CURSE AT YOU?: NEVER

## 2024-09-16 SDOH — ECONOMIC STABILITY: HOUSING INSECURITY: WHAT IS YOUR LIVING SITUATION TODAY?: SPOUSE

## 2024-09-16 SDOH — ECONOMIC STABILITY: GENERAL: WOULD YOU LIKE HELP WITH ANY OF THE FOLLOWING NEEDS?: I DON'T WANT HELP WITH ANY OF THESE

## 2024-09-16 SDOH — SOCIAL STABILITY: SOCIAL NETWORK
HOW OFTEN DO YOU SEE OR TALK TO PEOPLE THAT YOU CARE ABOUT AND FEEL CLOSE TO? (FOR EXAMPLE: TALKING TO FRIENDS ON THE PHONE, VISITING FRIENDS OR FAMILY, GOING TO CHURCH OR CLUB MEETINGS): 5 OR MORE TIMES A WEEK

## 2024-09-16 SDOH — HEALTH STABILITY: GENERAL: BECAUSE OF A PHYSICAL, MENTAL, OR EMOTIONAL CONDITION, DO YOU HAVE DIFFICULTY DOING ERRANDS ALONE?: NO

## 2024-09-16 SDOH — ECONOMIC STABILITY: TRANSPORTATION INSECURITY
IN THE PAST 12 MONTHS, HAS LACK OF RELIABLE TRANSPORTATION KEPT YOU FROM MEDICAL APPOINTMENTS, MEETINGS, WORK OR FROM GETTING THINGS NEEDED FOR DAILY LIVING?: NO

## 2024-09-16 SDOH — ECONOMIC STABILITY: HOUSING INSECURITY: WHAT IS YOUR LIVING SITUATION TODAY?: I HAVE A STEADY PLACE TO LIVE

## 2024-09-16 SDOH — ECONOMIC STABILITY: INCOME INSECURITY: IN THE PAST 12 MONTHS, HAS THE ELECTRIC, GAS, OIL, OR WATER COMPANY THREATENED TO SHUT OFF SERVICE IN YOUR HOME?: NO

## 2024-09-16 SDOH — ECONOMIC STABILITY: FOOD INSECURITY: WITHIN THE PAST 12 MONTHS, THE FOOD YOU BOUGHT JUST DIDN'T LAST AND YOU DIDN'T HAVE MONEY TO GET MORE.: NEVER TRUE

## 2024-09-16 SDOH — HEALTH STABILITY: PHYSICAL HEALTH: DO YOU HAVE DIFFICULTY DRESSING OR BATHING?: YES

## 2024-09-16 SDOH — ECONOMIC STABILITY: HOUSING INSECURITY: WHAT IS YOUR LIVING SITUATION TODAY?: APARTMENT;SENIOR APARTMENT

## 2024-09-16 SDOH — HEALTH STABILITY: GENERAL
BECAUSE OF A PHYSICAL, MENTAL, OR EMOTIONAL CONDITION, DO YOU HAVE SERIOUS DIFFICULTY CONCENTRATING, REMEMBERING OR MAKING DECISIONS?: NO

## 2024-09-16 SDOH — HEALTH STABILITY: PHYSICAL HEALTH: DO YOU HAVE SERIOUS DIFFICULTY WALKING OR CLIMBING STAIRS?: NO

## 2024-09-16 SDOH — SOCIAL STABILITY: SOCIAL INSECURITY: HOW OFTEN DOES ANYONE, INCLUDING FAMILY AND FRIENDS, INSULT OR TALK DOWN TO YOU?: NEVER

## 2024-09-16 SDOH — ECONOMIC STABILITY: HOUSING INSECURITY: DO YOU HAVE PROBLEMS WITH ANY OF THE FOLLOWING?: NONE OF THE ABOVE

## 2024-09-16 SDOH — SOCIAL STABILITY: SOCIAL INSECURITY: HOW OFTEN DOES ANYONE, INCLUDING FAMILY AND FRIENDS, THREATEN YOU WITH HARM?: NEVER

## 2024-09-16 ASSESSMENT — ORIENTATION MEMORY CONCENTRATION TEST (OMCT)
WHAT YEAR IS IT NOW (MUST BE EXACT): CORRECT
COUNT BACKWARDS FROM 20 TO 1: CORRECT
WHAT MONTH IS IT NOW: CORRECT
OMCT SCORE: 4
REPEAT THE NAME AND ADDRESS I ASKED YOU TO REMEMBER: CORRECT
OMCT INTERPRETATION: 0-6: NO SIGNIFICANT IMPAIRMENT
SAY THE MONTHS IN REVERSE ORDER STARTING WITH LAST MONTH: 2 OR MORE ERRORS
WHAT TIME IS IT (NO WATCH OR CLOCK): CORRECT

## 2024-09-16 ASSESSMENT — PAIN SCALES - GENERAL
PAINLEVEL_OUTOF10: 0
PAINLEVEL_OUTOF10: 0

## 2024-09-16 ASSESSMENT — PATIENT HEALTH QUESTIONNAIRE - PHQ9
IS PATIENT ABLE TO COMPLETE PHQ2 OR PHQ9: YES
SUM OF ALL RESPONSES TO PHQ9 QUESTIONS 1 AND 2: 1
CLINICAL INTERPRETATION OF PHQ2 SCORE: NO FURTHER SCREENING NEEDED
SUM OF ALL RESPONSES TO PHQ9 QUESTIONS 1 AND 2: 1
2. FEELING DOWN, DEPRESSED OR HOPELESS: SEVERAL DAYS
1. LITTLE INTEREST OR PLEASURE IN DOING THINGS: NOT AT ALL

## 2024-09-16 ASSESSMENT — ACTIVITIES OF DAILY LIVING (ADL)
ADL_SHORT_OF_BREATH: NO
ADL_SCORE: 6
TOILETING: NEEDS ASSISTANCE
DRESSING: NEEDS ASSISTANCE
ADL_BEFORE_ADMISSION: NEEDS/REQUIRES ASSISTANCE
FEEDING: NEEDS ASSISTANCE
RECENT_DECLINE_ADL: YES, DECLINE IN BATHING/DRESSING/FEEDING, COLLABORATE WITH PROVIDER (T)
BATHING: NEEDS ASSISTANCE

## 2024-09-16 ASSESSMENT — LIFESTYLE VARIABLES
HOW OFTEN DO YOU HAVE A DRINK CONTAINING ALCOHOL: NEVER
HOW MANY STANDARD DRINKS CONTAINING ALCOHOL DO YOU HAVE ON A TYPICAL DAY: 0,1 OR 2
ALCOHOL_USE_STATUS: NO OR LOW RISK WITH VALIDATED TOOL
HOW OFTEN DO YOU HAVE 6 OR MORE DRINKS ON ONE OCCASION: NEVER
AUDIT-C TOTAL SCORE: 0

## 2024-09-16 ASSESSMENT — COGNITIVE AND FUNCTIONAL STATUS - GENERAL: DO YOU HAVE DIFFICULTY DRESSING OR BATHING: NO

## 2024-09-16 ASSESSMENT — COLUMBIA-SUICIDE SEVERITY RATING SCALE - C-SSRS
1. WITHIN THE PAST MONTH, HAVE YOU WISHED YOU WERE DEAD OR WISHED YOU COULD GO TO SLEEP AND NOT WAKE UP?: NO
2. HAVE YOU ACTUALLY HAD ANY THOUGHTS OF KILLING YOURSELF?: NO
6. HAVE YOU EVER DONE ANYTHING, STARTED TO DO ANYTHING, OR PREPARED TO DO ANYTHING TO END YOUR LIFE?: NO

## 2024-09-17 LAB
ANION GAP SERPL CALC-SCNC: 10 MMOL/L (ref 7–19)
BASOPHILS # BLD: 0 K/MCL (ref 0–0.3)
BASOPHILS NFR BLD: 0 %
BUN SERPL-MCNC: 13 MG/DL (ref 6–20)
BUN/CREAT SERPL: 12 (ref 7–25)
CALCIUM SERPL-MCNC: 7.1 MG/DL (ref 8.4–10.2)
CHLORIDE SERPL-SCNC: 109 MMOL/L (ref 97–110)
CO2 SERPL-SCNC: 20 MMOL/L (ref 21–32)
CREAT SERPL-MCNC: 1.08 MG/DL (ref 0.67–1.17)
DEPRECATED RDW RBC: 40.1 FL (ref 39–50)
EGFRCR SERPLBLD CKD-EPI 2021: 66 ML/MIN/{1.73_M2}
EOSINOPHIL # BLD: 0.4 K/MCL (ref 0–0.5)
EOSINOPHIL NFR BLD: 4 %
ERYTHROCYTE [DISTWIDTH] IN BLOOD: 11.9 % (ref 11–15)
FASTING DURATION TIME PATIENT: ABNORMAL H
GLUCOSE SERPL-MCNC: 94 MG/DL (ref 70–99)
HCT VFR BLD CALC: 33.4 % (ref 39–51)
HGB BLD-MCNC: 11 G/DL (ref 13–17)
IMM GRANULOCYTES # BLD AUTO: 0 K/MCL (ref 0–0.2)
IMM GRANULOCYTES # BLD: 0 %
LYMPHOCYTES # BLD: 0.9 K/MCL (ref 1–4)
LYMPHOCYTES NFR BLD: 9 %
MCH RBC QN AUTO: 30.2 PG (ref 26–34)
MCHC RBC AUTO-ENTMCNC: 32.9 G/DL (ref 32–36.5)
MCV RBC AUTO: 91.8 FL (ref 78–100)
MONOCYTES # BLD: 1 K/MCL (ref 0.3–0.9)
MONOCYTES NFR BLD: 10 %
NEUTROPHILS # BLD: 7.7 K/MCL (ref 1.8–7.7)
NEUTROPHILS NFR BLD: 77 %
NRBC BLD MANUAL-RTO: 0 /100 WBC
PLATELET # BLD AUTO: 262 K/MCL (ref 140–450)
POTASSIUM SERPL-SCNC: 4.2 MMOL/L (ref 3.4–5.1)
RBC # BLD: 3.64 MIL/MCL (ref 4.5–5.9)
SODIUM SERPL-SCNC: 135 MMOL/L (ref 135–145)
WBC # BLD: 10 K/MCL (ref 4.2–11)

## 2024-09-17 PROCEDURE — 10002800 HB RX 250 W HCPCS: Performed by: INTERNAL MEDICINE

## 2024-09-17 PROCEDURE — 10004651 HB RX, NO CHARGE ITEM: Performed by: INTERNAL MEDICINE

## 2024-09-17 PROCEDURE — 10002803 HB RX 637: Performed by: INTERNAL MEDICINE

## 2024-09-17 PROCEDURE — 10002803 HB RX 637: Performed by: UROLOGY

## 2024-09-17 PROCEDURE — 36415 COLL VENOUS BLD VENIPUNCTURE: CPT | Performed by: INTERNAL MEDICINE

## 2024-09-17 PROCEDURE — G0378 HOSPITAL OBSERVATION PER HR: HCPCS

## 2024-09-17 PROCEDURE — 85025 COMPLETE CBC W/AUTO DIFF WBC: CPT | Performed by: INTERNAL MEDICINE

## 2024-09-17 PROCEDURE — 10002807 HB RX 258: Performed by: INTERNAL MEDICINE

## 2024-09-17 PROCEDURE — 10000002 HB ROOM CHARGE MED SURG

## 2024-09-17 PROCEDURE — 80048 BASIC METABOLIC PNL TOTAL CA: CPT | Performed by: INTERNAL MEDICINE

## 2024-09-17 RX ORDER — LOPERAMIDE HCL 2 MG
4 CAPSULE ORAL PRN
Status: DISCONTINUED | OUTPATIENT
Start: 2024-09-17 | End: 2024-09-19 | Stop reason: HOSPADM

## 2024-09-17 RX ORDER — METOPROLOL SUCCINATE 25 MG/1
25 TABLET, EXTENDED RELEASE ORAL EVERY 12 HOURS SCHEDULED
Status: DISCONTINUED | OUTPATIENT
Start: 2024-09-17 | End: 2024-09-19 | Stop reason: HOSPADM

## 2024-09-17 RX ORDER — PHENAZOPYRIDINE HYDROCHLORIDE 200 MG/1
200 TABLET, FILM COATED ORAL EVERY 8 HOURS PRN
Status: DISCONTINUED | OUTPATIENT
Start: 2024-09-17 | End: 2024-09-19 | Stop reason: HOSPADM

## 2024-09-17 RX ORDER — LISINOPRIL 20 MG/1
20 TABLET ORAL AT BEDTIME
Status: DISCONTINUED | OUTPATIENT
Start: 2024-09-17 | End: 2024-09-19 | Stop reason: HOSPADM

## 2024-09-17 RX ADMIN — PHENAZOPYRIDINE 200 MG: 200 TABLET ORAL at 15:55

## 2024-09-17 RX ADMIN — AMLODIPINE BESYLATE 2.5 MG: 5 TABLET ORAL at 21:21

## 2024-09-17 RX ADMIN — SODIUM CHLORIDE, PRESERVATIVE FREE 2 ML: 5 INJECTION INTRAVENOUS at 08:19

## 2024-09-17 RX ADMIN — GABAPENTIN 600 MG: 300 CAPSULE ORAL at 21:20

## 2024-09-17 RX ADMIN — PIPERACILLIN SODIUM AND TAZOBACTAM SODIUM 3.38 G: 3; .375 INJECTION, POWDER, FOR SOLUTION INTRAVENOUS at 13:36

## 2024-09-17 RX ADMIN — ATORVASTATIN CALCIUM 40 MG: 40 TABLET, FILM COATED ORAL at 21:20

## 2024-09-17 RX ADMIN — PIPERACILLIN SODIUM AND TAZOBACTAM SODIUM 3.38 G: 3; .375 INJECTION, POWDER, FOR SOLUTION INTRAVENOUS at 05:34

## 2024-09-17 RX ADMIN — METOPROLOL SUCCINATE 25 MG: 25 TABLET, EXTENDED RELEASE ORAL at 08:19

## 2024-09-17 RX ADMIN — AMLODIPINE BESYLATE 5 MG: 5 TABLET ORAL at 21:20

## 2024-09-17 RX ADMIN — PIPERACILLIN SODIUM AND TAZOBACTAM SODIUM 3.38 G: 3; .375 INJECTION, POWDER, FOR SOLUTION INTRAVENOUS at 21:29

## 2024-09-17 ASSESSMENT — PAIN SCALES - GENERAL
PAINLEVEL_OUTOF10: 0
PAINLEVEL_OUTOF10: 0

## 2024-09-17 ASSESSMENT — PAIN SCALES - WONG BAKER: WONGBAKER_NUMERICALRESPONSE: 0

## 2024-09-18 LAB
ALBUMIN SERPL-MCNC: 2.3 G/DL (ref 3.4–5)
ANION GAP SERPL CALC-SCNC: 12 MMOL/L (ref 7–19)
APPEARANCE UR: CLEAR
BACTERIA #/AREA URNS HPF: ABNORMAL /HPF
BASOPHILS # BLD: 0 K/MCL (ref 0–0.3)
BASOPHILS NFR BLD: 0 %
BILIRUB UR QL STRIP: POSITIVE
BUN SERPL-MCNC: 14 MG/DL (ref 6–20)
BUN/CREAT SERPL: 13 (ref 7–25)
C DIFF TOX B TCDB STL QL NAA+PROBE: NOT DETECTED
CALCIUM SERPL-MCNC: 7 MG/DL (ref 8.4–10.2)
CHLORIDE SERPL-SCNC: 109 MMOL/L (ref 97–110)
CO2 SERPL-SCNC: 18 MMOL/L (ref 21–32)
COLOR UR: ABNORMAL
CREAT SERPL-MCNC: 1.04 MG/DL (ref 0.67–1.17)
DEPRECATED RDW RBC: 38.9 FL (ref 39–50)
EGFRCR SERPLBLD CKD-EPI 2021: 69 ML/MIN/{1.73_M2}
EOSINOPHIL # BLD: 0.2 K/MCL (ref 0–0.5)
EOSINOPHIL NFR BLD: 2 %
ERYTHROCYTE [DISTWIDTH] IN BLOOD: 12 % (ref 11–15)
FASTING DURATION TIME PATIENT: ABNORMAL H
GLUCOSE SERPL-MCNC: 97 MG/DL (ref 70–99)
GLUCOSE UR STRIP-MCNC: NEGATIVE MG/DL
HCT VFR BLD CALC: 32.9 % (ref 39–51)
HGB BLD-MCNC: 11.2 G/DL (ref 13–17)
HGB UR QL STRIP: ABNORMAL
HYALINE CASTS #/AREA URNS LPF: ABNORMAL /LPF
IMM GRANULOCYTES # BLD AUTO: 0 K/MCL (ref 0–0.2)
IMM GRANULOCYTES # BLD: 0 %
KETONES UR STRIP-MCNC: NEGATIVE MG/DL
LEUKOCYTE ESTERASE UR QL STRIP: ABNORMAL
LYMPHOCYTES # BLD: 0.7 K/MCL (ref 1–4)
LYMPHOCYTES NFR BLD: 7 %
MCH RBC QN AUTO: 30.4 PG (ref 26–34)
MCHC RBC AUTO-ENTMCNC: 34 G/DL (ref 32–36.5)
MCV RBC AUTO: 89.2 FL (ref 78–100)
MONOCYTES # BLD: 0.9 K/MCL (ref 0.3–0.9)
MONOCYTES NFR BLD: 8 %
MUCOUS THREADS URNS QL MICRO: PRESENT
NEUTROPHILS # BLD: 8.9 K/MCL (ref 1.8–7.7)
NEUTROPHILS NFR BLD: 83 %
NITRITE UR QL STRIP: POSITIVE
NRBC BLD MANUAL-RTO: 0 /100 WBC
PH UR STRIP: 6 [PH] (ref 5–7)
PLATELET # BLD AUTO: 278 K/MCL (ref 140–450)
POTASSIUM SERPL-SCNC: 3.6 MMOL/L (ref 3.4–5.1)
PROT UR STRIP-MCNC: 100 MG/DL
RBC # BLD: 3.69 MIL/MCL (ref 4.5–5.9)
RBC #/AREA URNS HPF: >100 /HPF
SODIUM SERPL-SCNC: 135 MMOL/L (ref 135–145)
SP GR UR STRIP: 1.02 (ref 1–1.03)
SQUAMOUS #/AREA URNS HPF: ABNORMAL /HPF
UROBILINOGEN UR STRIP-MCNC: 4 MG/DL
WBC # BLD: 10.8 K/MCL (ref 4.2–11)
WBC #/AREA URNS HPF: ABNORMAL /HPF

## 2024-09-18 PROCEDURE — 97530 THERAPEUTIC ACTIVITIES: CPT

## 2024-09-18 PROCEDURE — 80048 BASIC METABOLIC PNL TOTAL CA: CPT | Performed by: INTERNAL MEDICINE

## 2024-09-18 PROCEDURE — 87493 C DIFF AMPLIFIED PROBE: CPT | Performed by: INTERNAL MEDICINE

## 2024-09-18 PROCEDURE — 36415 COLL VENOUS BLD VENIPUNCTURE: CPT | Performed by: INTERNAL MEDICINE

## 2024-09-18 PROCEDURE — 82040 ASSAY OF SERUM ALBUMIN: CPT | Performed by: INTERNAL MEDICINE

## 2024-09-18 PROCEDURE — 10002807 HB RX 258: Performed by: INTERNAL MEDICINE

## 2024-09-18 PROCEDURE — 97162 PT EVAL MOD COMPLEX 30 MIN: CPT

## 2024-09-18 PROCEDURE — 10002800 HB RX 250 W HCPCS: Performed by: INTERNAL MEDICINE

## 2024-09-18 PROCEDURE — 10002803 HB RX 637: Performed by: HOSPITALIST

## 2024-09-18 PROCEDURE — 87086 URINE CULTURE/COLONY COUNT: CPT | Performed by: INTERNAL MEDICINE

## 2024-09-18 PROCEDURE — 10002803 HB RX 637: Performed by: INTERNAL MEDICINE

## 2024-09-18 PROCEDURE — 10004651 HB RX, NO CHARGE ITEM: Performed by: INTERNAL MEDICINE

## 2024-09-18 PROCEDURE — 81001 URINALYSIS AUTO W/SCOPE: CPT | Performed by: INTERNAL MEDICINE

## 2024-09-18 PROCEDURE — 10002803 HB RX 637: Performed by: UROLOGY

## 2024-09-18 PROCEDURE — 85025 COMPLETE CBC W/AUTO DIFF WBC: CPT | Performed by: INTERNAL MEDICINE

## 2024-09-18 PROCEDURE — 10000002 HB ROOM CHARGE MED SURG

## 2024-09-18 RX ADMIN — PIPERACILLIN SODIUM AND TAZOBACTAM SODIUM 3.38 G: 3; .375 INJECTION, POWDER, FOR SOLUTION INTRAVENOUS at 06:40

## 2024-09-18 RX ADMIN — METOPROLOL SUCCINATE 25 MG: 25 TABLET, EXTENDED RELEASE ORAL at 09:16

## 2024-09-18 RX ADMIN — SODIUM CHLORIDE, PRESERVATIVE FREE 2 ML: 5 INJECTION INTRAVENOUS at 20:53

## 2024-09-18 RX ADMIN — PIPERACILLIN SODIUM AND TAZOBACTAM SODIUM 3.38 G: 3; .375 INJECTION, POWDER, FOR SOLUTION INTRAVENOUS at 15:30

## 2024-09-18 RX ADMIN — ATORVASTATIN CALCIUM 40 MG: 40 TABLET, FILM COATED ORAL at 20:52

## 2024-09-18 RX ADMIN — PHENAZOPYRIDINE 200 MG: 200 TABLET ORAL at 01:25

## 2024-09-18 RX ADMIN — LISINOPRIL 20 MG: 20 TABLET ORAL at 01:25

## 2024-09-18 RX ADMIN — METOPROLOL SUCCINATE 25 MG: 25 TABLET, EXTENDED RELEASE ORAL at 20:52

## 2024-09-18 RX ADMIN — AMLODIPINE BESYLATE 2.5 MG: 5 TABLET ORAL at 20:52

## 2024-09-18 RX ADMIN — SODIUM CHLORIDE, PRESERVATIVE FREE 2 ML: 5 INJECTION INTRAVENOUS at 09:33

## 2024-09-18 RX ADMIN — LOPERAMIDE HYDROCHLORIDE 4 MG: 2 CAPSULE ORAL at 11:41

## 2024-09-18 RX ADMIN — GABAPENTIN 600 MG: 300 CAPSULE ORAL at 20:52

## 2024-09-18 RX ADMIN — LISINOPRIL 20 MG: 20 TABLET ORAL at 20:53

## 2024-09-18 RX ADMIN — METOPROLOL SUCCINATE 25 MG: 25 TABLET, EXTENDED RELEASE ORAL at 01:25

## 2024-09-18 RX ADMIN — AMLODIPINE BESYLATE 5 MG: 5 TABLET ORAL at 20:53

## 2024-09-18 RX ADMIN — SODIUM CHLORIDE: 9 INJECTION, SOLUTION INTRAVENOUS at 01:39

## 2024-09-18 RX ADMIN — SODIUM CHLORIDE 25 ML: 9 INJECTION, SOLUTION INTRAVENOUS at 21:13

## 2024-09-18 RX ADMIN — PIPERACILLIN SODIUM AND TAZOBACTAM SODIUM 3.38 G: 3; .375 INJECTION, POWDER, FOR SOLUTION INTRAVENOUS at 21:14

## 2024-09-18 RX ADMIN — SODIUM CHLORIDE 25 ML: 9 INJECTION, SOLUTION INTRAVENOUS at 06:37

## 2024-09-18 ASSESSMENT — PAIN SCALES - GENERAL
PAINLEVEL_OUTOF10: 0

## 2024-09-18 ASSESSMENT — COGNITIVE AND FUNCTIONAL STATUS - GENERAL
BASIC_MOBILITY_RAW_SCORE: 17
BASIC_MOBILITY_CONVERTED_SCORE: 39.67

## 2024-09-18 ASSESSMENT — ACTIVITIES OF DAILY LIVING (ADL): PRIOR_ADL: MINIMAL ASSIST (MIN)

## 2024-09-19 VITALS
HEART RATE: 78 BPM | OXYGEN SATURATION: 96 % | BODY MASS INDEX: 25.43 KG/M2 | SYSTOLIC BLOOD PRESSURE: 128 MMHG | DIASTOLIC BLOOD PRESSURE: 77 MMHG | HEIGHT: 67 IN | WEIGHT: 162 LBS | RESPIRATION RATE: 16 BRPM | TEMPERATURE: 97.5 F

## 2024-09-19 LAB
ANION GAP SERPL CALC-SCNC: 10 MMOL/L (ref 7–19)
BACTERIA UR CULT: NO GROWTH
BASOPHILS # BLD: 0 K/MCL (ref 0–0.3)
BASOPHILS NFR BLD: 0 %
BUN SERPL-MCNC: 12 MG/DL (ref 6–20)
BUN/CREAT SERPL: 12 (ref 7–25)
CALCIUM SERPL-MCNC: 7.5 MG/DL (ref 8.4–10.2)
CHLORIDE SERPL-SCNC: 108 MMOL/L (ref 97–110)
CO2 SERPL-SCNC: 20 MMOL/L (ref 21–32)
CREAT SERPL-MCNC: 0.97 MG/DL (ref 0.67–1.17)
DEPRECATED RDW RBC: 38.6 FL (ref 39–50)
EGFRCR SERPLBLD CKD-EPI 2021: 75 ML/MIN/{1.73_M2}
EOSINOPHIL # BLD: 0.4 K/MCL (ref 0–0.5)
EOSINOPHIL NFR BLD: 5 %
ERYTHROCYTE [DISTWIDTH] IN BLOOD: 11.9 % (ref 11–15)
FASTING DURATION TIME PATIENT: ABNORMAL H
GLUCOSE SERPL-MCNC: 94 MG/DL (ref 70–99)
HCT VFR BLD CALC: 32.5 % (ref 39–51)
HGB BLD-MCNC: 11 G/DL (ref 13–17)
IMM GRANULOCYTES # BLD AUTO: 0.1 K/MCL (ref 0–0.2)
IMM GRANULOCYTES # BLD: 1 %
LYMPHOCYTES # BLD: 0.9 K/MCL (ref 1–4)
LYMPHOCYTES NFR BLD: 10 %
MCH RBC QN AUTO: 30.3 PG (ref 26–34)
MCHC RBC AUTO-ENTMCNC: 33.8 G/DL (ref 32–36.5)
MCV RBC AUTO: 89.5 FL (ref 78–100)
MONOCYTES # BLD: 1 K/MCL (ref 0.3–0.9)
MONOCYTES NFR BLD: 11 %
NEUTROPHILS # BLD: 6.9 K/MCL (ref 1.8–7.7)
NEUTROPHILS NFR BLD: 73 %
NRBC BLD MANUAL-RTO: 0 /100 WBC
PLATELET # BLD AUTO: 257 K/MCL (ref 140–450)
POTASSIUM SERPL-SCNC: 3.7 MMOL/L (ref 3.4–5.1)
RBC # BLD: 3.63 MIL/MCL (ref 4.5–5.9)
SODIUM SERPL-SCNC: 134 MMOL/L (ref 135–145)
WBC # BLD: 9.4 K/MCL (ref 4.2–11)

## 2024-09-19 PROCEDURE — 97535 SELF CARE MNGMENT TRAINING: CPT

## 2024-09-19 PROCEDURE — 97165 OT EVAL LOW COMPLEX 30 MIN: CPT

## 2024-09-19 PROCEDURE — 80048 BASIC METABOLIC PNL TOTAL CA: CPT | Performed by: INTERNAL MEDICINE

## 2024-09-19 PROCEDURE — 10004651 HB RX, NO CHARGE ITEM: Performed by: INTERNAL MEDICINE

## 2024-09-19 PROCEDURE — 99223 1ST HOSP IP/OBS HIGH 75: CPT | Performed by: NURSE PRACTITIONER

## 2024-09-19 PROCEDURE — 10002803 HB RX 637: Performed by: INTERNAL MEDICINE

## 2024-09-19 PROCEDURE — 10002803 HB RX 637: Performed by: HOSPITALIST

## 2024-09-19 PROCEDURE — 85025 COMPLETE CBC W/AUTO DIFF WBC: CPT | Performed by: INTERNAL MEDICINE

## 2024-09-19 PROCEDURE — 36415 COLL VENOUS BLD VENIPUNCTURE: CPT | Performed by: INTERNAL MEDICINE

## 2024-09-19 PROCEDURE — 10002800 HB RX 250 W HCPCS: Performed by: INTERNAL MEDICINE

## 2024-09-19 PROCEDURE — 10002807 HB RX 258: Performed by: INTERNAL MEDICINE

## 2024-09-19 RX ORDER — PHENAZOPYRIDINE HYDROCHLORIDE 200 MG/1
200 TABLET, FILM COATED ORAL EVERY 8 HOURS PRN
Qty: 24 TABLET | Refills: 0 | Status: SHIPPED | OUTPATIENT
Start: 2024-09-19

## 2024-09-19 RX ADMIN — SODIUM CHLORIDE, PRESERVATIVE FREE 2 ML: 5 INJECTION INTRAVENOUS at 08:50

## 2024-09-19 RX ADMIN — SODIUM CHLORIDE 25 ML: 9 INJECTION, SOLUTION INTRAVENOUS at 06:04

## 2024-09-19 RX ADMIN — PIPERACILLIN SODIUM AND TAZOBACTAM SODIUM 3.38 G: 3; .375 INJECTION, POWDER, FOR SOLUTION INTRAVENOUS at 06:04

## 2024-09-19 RX ADMIN — METOPROLOL SUCCINATE 25 MG: 25 TABLET, EXTENDED RELEASE ORAL at 08:49

## 2024-09-19 RX ADMIN — LOPERAMIDE HYDROCHLORIDE 4 MG: 2 CAPSULE ORAL at 06:08

## 2024-09-19 RX ADMIN — LOPERAMIDE HYDROCHLORIDE 4 MG: 2 CAPSULE ORAL at 02:18

## 2024-09-19 RX ADMIN — LOPERAMIDE HYDROCHLORIDE 4 MG: 2 CAPSULE ORAL at 08:58

## 2024-09-19 ASSESSMENT — COGNITIVE AND FUNCTIONAL STATUS - GENERAL
HELP NEEDED DRESSING REGULAR LOWER BODY CLOTHING: A LITTLE
HELP NEEDED FOR TOILETING: A LOT
HELP NEEDED FOR BATHING: A LOT
DAILY_ACTIVITY_RAW_SCORE: 19
DAILY_ACTIVITY_CONVERTED_SCORE: 40.22

## 2024-09-19 ASSESSMENT — ENCOUNTER SYMPTOMS
PAIN SEVERITY NOW: 0
DIARRHEA: 1

## 2024-09-19 ASSESSMENT — ACTIVITIES OF DAILY LIVING (ADL)
PRIOR_ADL: MINIMAL ASSIST (MIN)
HOME_MANAGEMENT_TIME_ENTRY: 15
EATING: INDEPENDENT

## 2024-09-19 ASSESSMENT — PAIN SCALES - GENERAL: PAINLEVEL_OUTOF10: 4

## 2024-09-30 RX ORDER — AMLODIPINE BESYLATE 2.5 MG/1
2.5 TABLET ORAL NIGHTLY
COMMUNITY

## 2024-09-30 RX ORDER — PHENAZOPYRIDINE HYDROCHLORIDE 200 MG/1
200 TABLET, FILM COATED ORAL 3 TIMES DAILY PRN
COMMUNITY

## 2024-10-01 ENCOUNTER — EKG ENCOUNTER (OUTPATIENT)
Dept: LAB | Age: 89
End: 2024-10-01
Payer: MEDICARE

## 2024-10-01 DIAGNOSIS — Z01.818 PRE-OP TESTING: ICD-10-CM

## 2024-10-01 PROCEDURE — 93005 ELECTROCARDIOGRAM TRACING: CPT

## 2024-10-01 PROCEDURE — 93010 ELECTROCARDIOGRAM REPORT: CPT | Performed by: INTERNAL MEDICINE

## 2024-10-03 LAB
Q-T INTERVAL: 376 MS
QRS DURATION: 80 MS
QTC CALCULATION (BEZET): 485 MS
R AXIS: -1 DEGREES
T AXIS: 0 DEGREES
VENTRICULAR RATE: 100 BPM

## 2024-10-11 ENCOUNTER — HOSPITAL ENCOUNTER (OUTPATIENT)
Facility: HOSPITAL | Age: 89
Discharge: HOME | End: 2024-10-15
Attending: UROLOGY | Admitting: UROLOGY
Payer: MEDICARE

## 2024-10-11 ENCOUNTER — ANESTHESIA EVENT (OUTPATIENT)
Dept: SURGERY | Facility: HOSPITAL | Age: 89
End: 2024-10-11
Payer: MEDICARE

## 2024-10-11 ENCOUNTER — ANESTHESIA (OUTPATIENT)
Dept: SURGERY | Facility: HOSPITAL | Age: 89
End: 2024-10-11
Payer: MEDICARE

## 2024-10-11 DIAGNOSIS — Z01.818 PRE-OP TESTING: Primary | ICD-10-CM

## 2024-10-11 PROBLEM — R31.0 GROSS HEMATURIA: Status: ACTIVE | Noted: 2024-10-11

## 2024-10-11 PROCEDURE — 0T9B80Z DRAINAGE OF BLADDER WITH DRAINAGE DEVICE, VIA NATURAL OR ARTIFICIAL OPENING ENDOSCOPIC: ICD-10-PCS | Performed by: UROLOGY

## 2024-10-11 PROCEDURE — 0VT08ZZ RESECTION OF PROSTATE, VIA NATURAL OR ARTIFICIAL OPENING ENDOSCOPIC: ICD-10-PCS | Performed by: UROLOGY

## 2024-10-11 RX ORDER — ONDANSETRON 2 MG/ML
4 INJECTION INTRAMUSCULAR; INTRAVENOUS EVERY 6 HOURS PRN
Status: DISCONTINUED | OUTPATIENT
Start: 2024-10-11 | End: 2024-10-11 | Stop reason: HOSPADM

## 2024-10-11 RX ORDER — DOCUSATE SODIUM 100 MG/1
100 CAPSULE, LIQUID FILLED ORAL DAILY
Status: DISCONTINUED | OUTPATIENT
Start: 2024-10-11 | End: 2024-10-15

## 2024-10-11 RX ORDER — SODIUM CHLORIDE, SODIUM LACTATE, POTASSIUM CHLORIDE, CALCIUM CHLORIDE 600; 310; 30; 20 MG/100ML; MG/100ML; MG/100ML; MG/100ML
INJECTION, SOLUTION INTRAVENOUS CONTINUOUS
Status: DISCONTINUED | OUTPATIENT
Start: 2024-10-11 | End: 2024-10-12

## 2024-10-11 RX ORDER — HYDROCODONE BITARTRATE AND ACETAMINOPHEN 5; 325 MG/1; MG/1
1 TABLET ORAL ONCE AS NEEDED
Status: DISCONTINUED | OUTPATIENT
Start: 2024-10-11 | End: 2024-10-11 | Stop reason: HOSPADM

## 2024-10-11 RX ORDER — SODIUM CHLORIDE, SODIUM LACTATE, POTASSIUM CHLORIDE, CALCIUM CHLORIDE 600; 310; 30; 20 MG/100ML; MG/100ML; MG/100ML; MG/100ML
INJECTION, SOLUTION INTRAVENOUS CONTINUOUS
Status: DISCONTINUED | OUTPATIENT
Start: 2024-10-11 | End: 2024-10-11 | Stop reason: HOSPADM

## 2024-10-11 RX ORDER — SENNOSIDES 8.6 MG
17.2 TABLET ORAL NIGHTLY PRN
Status: DISCONTINUED | OUTPATIENT
Start: 2024-10-11 | End: 2024-10-15

## 2024-10-11 RX ORDER — ACETAMINOPHEN 500 MG
1000 TABLET ORAL ONCE AS NEEDED
Status: DISCONTINUED | OUTPATIENT
Start: 2024-10-11 | End: 2024-10-11 | Stop reason: HOSPADM

## 2024-10-11 RX ORDER — LISINOPRIL 20 MG/1
20 TABLET ORAL DAILY
Status: DISCONTINUED | OUTPATIENT
Start: 2024-10-11 | End: 2024-10-11 | Stop reason: SDUPTHER

## 2024-10-11 RX ORDER — ONDANSETRON 2 MG/ML
4 INJECTION INTRAMUSCULAR; INTRAVENOUS EVERY 6 HOURS PRN
Status: DISCONTINUED | OUTPATIENT
Start: 2024-10-11 | End: 2024-10-15

## 2024-10-11 RX ORDER — PHENOL 1.4 %
1200 AEROSOL, SPRAY (ML) MUCOUS MEMBRANE NIGHTLY
COMMUNITY

## 2024-10-11 RX ORDER — ALBUTEROL SULFATE 90 UG/1
1-2 INHALANT RESPIRATORY (INHALATION) EVERY 4 HOURS PRN
Status: DISCONTINUED | OUTPATIENT
Start: 2024-10-11 | End: 2024-10-15

## 2024-10-11 RX ORDER — LISINOPRIL 20 MG/1
20 TABLET ORAL NIGHTLY
Status: DISCONTINUED | OUTPATIENT
Start: 2024-10-11 | End: 2024-10-15

## 2024-10-11 RX ORDER — NALOXONE HYDROCHLORIDE 0.4 MG/ML
0.08 INJECTION, SOLUTION INTRAMUSCULAR; INTRAVENOUS; SUBCUTANEOUS AS NEEDED
Status: DISCONTINUED | OUTPATIENT
Start: 2024-10-11 | End: 2024-10-11 | Stop reason: HOSPADM

## 2024-10-11 RX ORDER — AMLODIPINE BESYLATE 2.5 MG/1
2.5 TABLET ORAL NIGHTLY
Status: DISCONTINUED | OUTPATIENT
Start: 2024-10-11 | End: 2024-10-11 | Stop reason: SDUPTHER

## 2024-10-11 RX ORDER — TEMAZEPAM 15 MG/1
1 CAPSULE ORAL NIGHTLY PRN
COMMUNITY
Start: 2024-10-08

## 2024-10-11 RX ORDER — HYDROCODONE BITARTRATE AND ACETAMINOPHEN 5; 325 MG/1; MG/1
2 TABLET ORAL ONCE AS NEEDED
Status: DISCONTINUED | OUTPATIENT
Start: 2024-10-11 | End: 2024-10-11 | Stop reason: HOSPADM

## 2024-10-11 RX ORDER — HYDROMORPHONE HYDROCHLORIDE 1 MG/ML
0.4 INJECTION, SOLUTION INTRAMUSCULAR; INTRAVENOUS; SUBCUTANEOUS EVERY 5 MIN PRN
Status: DISCONTINUED | OUTPATIENT
Start: 2024-10-11 | End: 2024-10-11 | Stop reason: HOSPADM

## 2024-10-11 RX ORDER — FLUTICASONE PROPIONATE AND SALMETEROL 250; 50 UG/1; UG/1
1 POWDER RESPIRATORY (INHALATION) AS NEEDED
Status: DISCONTINUED | OUTPATIENT
Start: 2024-10-11 | End: 2024-10-15

## 2024-10-11 RX ORDER — METOCLOPRAMIDE HYDROCHLORIDE 5 MG/ML
10 INJECTION INTRAMUSCULAR; INTRAVENOUS EVERY 8 HOURS PRN
Status: DISCONTINUED | OUTPATIENT
Start: 2024-10-11 | End: 2024-10-11 | Stop reason: HOSPADM

## 2024-10-11 RX ORDER — ONDANSETRON 4 MG/1
4 TABLET, ORALLY DISINTEGRATING ORAL EVERY 6 HOURS PRN
Status: DISCONTINUED | OUTPATIENT
Start: 2024-10-11 | End: 2024-10-15

## 2024-10-11 RX ORDER — METOPROLOL SUCCINATE 25 MG/1
25 TABLET, EXTENDED RELEASE ORAL
Status: DISCONTINUED | OUTPATIENT
Start: 2024-10-11 | End: 2024-10-15

## 2024-10-11 RX ORDER — ACETAMINOPHEN 325 MG/1
650 TABLET ORAL
Status: DISCONTINUED | OUTPATIENT
Start: 2024-10-11 | End: 2024-10-15

## 2024-10-11 RX ORDER — ATORVASTATIN CALCIUM 40 MG/1
40 TABLET, FILM COATED ORAL DAILY
Status: DISCONTINUED | OUTPATIENT
Start: 2024-10-11 | End: 2024-10-15

## 2024-10-11 RX ORDER — ONDANSETRON 2 MG/ML
INJECTION INTRAMUSCULAR; INTRAVENOUS AS NEEDED
Status: DISCONTINUED | OUTPATIENT
Start: 2024-10-11 | End: 2024-10-11 | Stop reason: SURG

## 2024-10-11 RX ORDER — SODIUM CHLORIDE 9 MG/ML
INJECTION, SOLUTION INTRAVENOUS CONTINUOUS
Status: DISCONTINUED | OUTPATIENT
Start: 2024-10-11 | End: 2024-10-15

## 2024-10-11 RX ORDER — METOPROLOL SUCCINATE 25 MG/1
25 TABLET, EXTENDED RELEASE ORAL DAILY
Status: DISCONTINUED | OUTPATIENT
Start: 2024-10-12 | End: 2024-10-11 | Stop reason: SDUPTHER

## 2024-10-11 RX ORDER — ACETAMINOPHEN 500 MG
1000 TABLET ORAL ONCE
Status: DISCONTINUED | OUTPATIENT
Start: 2024-10-11 | End: 2024-10-11 | Stop reason: HOSPADM

## 2024-10-11 RX ORDER — PHENAZOPYRIDINE HYDROCHLORIDE 200 MG/1
200 TABLET, FILM COATED ORAL 3 TIMES DAILY PRN
Status: DISCONTINUED | OUTPATIENT
Start: 2024-10-11 | End: 2024-10-15

## 2024-10-11 RX ORDER — HYDROMORPHONE HYDROCHLORIDE 1 MG/ML
0.6 INJECTION, SOLUTION INTRAMUSCULAR; INTRAVENOUS; SUBCUTANEOUS EVERY 5 MIN PRN
Status: DISCONTINUED | OUTPATIENT
Start: 2024-10-11 | End: 2024-10-11 | Stop reason: HOSPADM

## 2024-10-11 RX ORDER — OXYCODONE HYDROCHLORIDE 5 MG/1
5 TABLET ORAL EVERY 4 HOURS PRN
Status: DISCONTINUED | OUTPATIENT
Start: 2024-10-11 | End: 2024-10-15

## 2024-10-11 RX ORDER — TEMAZEPAM 15 MG/1
15 CAPSULE ORAL NIGHTLY PRN
Status: DISCONTINUED | OUTPATIENT
Start: 2024-10-11 | End: 2024-10-15

## 2024-10-11 RX ORDER — LIDOCAINE HYDROCHLORIDE 10 MG/ML
INJECTION, SOLUTION EPIDURAL; INFILTRATION; INTRACAUDAL; PERINEURAL AS NEEDED
Status: DISCONTINUED | OUTPATIENT
Start: 2024-10-11 | End: 2024-10-11 | Stop reason: SURG

## 2024-10-11 RX ORDER — HYDROMORPHONE HYDROCHLORIDE 1 MG/ML
0.2 INJECTION, SOLUTION INTRAMUSCULAR; INTRAVENOUS; SUBCUTANEOUS EVERY 2 HOUR PRN
Status: DISCONTINUED | OUTPATIENT
Start: 2024-10-11 | End: 2024-10-15

## 2024-10-11 RX ORDER — AMLODIPINE BESYLATE 5 MG/1
5 TABLET ORAL NIGHTLY
Status: DISCONTINUED | OUTPATIENT
Start: 2024-10-12 | End: 2024-10-11

## 2024-10-11 RX ORDER — HYDROMORPHONE HYDROCHLORIDE 1 MG/ML
0.4 INJECTION, SOLUTION INTRAMUSCULAR; INTRAVENOUS; SUBCUTANEOUS EVERY 2 HOUR PRN
Status: DISCONTINUED | OUTPATIENT
Start: 2024-10-11 | End: 2024-10-15

## 2024-10-11 RX ORDER — HYDROMORPHONE HYDROCHLORIDE 1 MG/ML
0.2 INJECTION, SOLUTION INTRAMUSCULAR; INTRAVENOUS; SUBCUTANEOUS EVERY 5 MIN PRN
Status: DISCONTINUED | OUTPATIENT
Start: 2024-10-11 | End: 2024-10-11 | Stop reason: HOSPADM

## 2024-10-11 RX ADMIN — LIDOCAINE HYDROCHLORIDE 50 MG: 10 INJECTION, SOLUTION EPIDURAL; INFILTRATION; INTRACAUDAL; PERINEURAL at 13:35:00

## 2024-10-11 RX ADMIN — ONDANSETRON 4 MG: 2 INJECTION INTRAMUSCULAR; INTRAVENOUS at 14:38:00

## 2024-10-11 RX ADMIN — SODIUM CHLORIDE, SODIUM LACTATE, POTASSIUM CHLORIDE, CALCIUM CHLORIDE: 600; 310; 30; 20 INJECTION, SOLUTION INTRAVENOUS at 13:31:00

## 2024-10-11 NOTE — ANESTHESIA PROCEDURE NOTES
Airway  Date/Time: 10/11/2024 1:36 PM  Urgency: elective      General Information and Staff    Patient location during procedure: OR  Anesthesiologist: Jesus Manuel Saul MD  Resident/CRNA: Uriah Cisneros CRNA  Performed: CRNA   Performed by: Uriah Cisneros CRNA  Authorized by: Jesus Manuel Saul MD      Indications and Patient Condition  Indications for airway management: anesthesia  Spontaneous Ventilation: absent  Sedation level: deep  Preoxygenated: yes  Patient position: sniffing  MILS maintained throughout  Mask difficulty assessment: 0 - not attempted  Planned trial extubation    Final Airway Details  Final airway type: supraglottic airway      Successful airway: classic  Size 4       Number of attempts at approach: 1  Number of other approaches attempted: 0    Additional Comments  Dentition per pre op

## 2024-10-11 NOTE — OPERATIVE REPORT
[facility]   part of [facility]        Dada Stockton Patient Status:  Hospital Outpatient Surgery    1934 MRN [id number]   Location [facility] POST ANESTHESIA CARE UNIT Attending Margret Lake MD   Hosp Day # 0 PCP Toñito Charles MD     Date of Operation; 10/11/2024    Procedure: Cystoscopy, Transurethral Resection of Prostate, and Suprapubic Catheter Placement     Pre-Op Diagnosis:  Hematuria, urinary retention, BPH, and prostate cancer   Post-Op Diagnosis: Same     Surgeon: Margret Laek M.D.    Anesthesia:  General   Specimens: None.   Blood Loss: 10 ml  Complications:  None  Drains:  22 Fr 3-way Bazzi, CBI and a 16 Fr Bazzi for suprapubic catheter     Findings: Anterior Urethra was normal.  The prostate was enlarged and showed extension into trigone area with multiple nodules along the bladder floor - likely known prostate cancer disease; some of the masses had friable surface with acute bleeding. The ureteral orifices were not observed. The bladder showed significant trabeculations, no diverticula. Hemostasis was controlled with bipolar button instrument.     Indications:  The patient is an 89 year old male who has a know history of locally advanced prostate cancer disease, recurrent hematuria, and significant lower urinary tract symptoms of frequency / urgency, and bladder pressure/pain.  He has a sensation of incomplete emptying and pain. Patient has previously failed medical treatment on alpha-1 inhibitors and anticohlinergics.  He wishes to proceed with surgical intervention as his symptoms significantly impair his quality of life.  All risks, benefits, and potential adverse events were reviewed, as well as the personal involved in the surgery, and a consent was signed.     Technique:  The patient was brought to the operating room and placed in a supine position. A general anesthesia was induced.  Preoperative antibiotics were administered.  The patient was prepped and  draped in the dorsal lithotomy position.  Sequential compression devices were placed on the lower extremities.  A time-out was reviewed.     A cystoscope was passed through the urethra under direct vision and the urethra and bladder were examined, with the findings as described above.  A 26-Slovenian resectoscope sheath was passed into the urethra, and then a bipolar button was used to resect the prostate and control the bleeding; again, his prostate appears to have locally advanced prostate cancer disease. Prior to proceeding with resection, the Veru montanum (external sphincter) was clearly identified and careful attention was paid not to injure this structure. The prostate was resected in a systematic manner and hematuria was controlled.  The irrigation was returning with only the slightest pink tinge.  At this time a small 1-2 cm suprapubic skin incision was created with an 11 blade scalpel and a metal 16 Fr trocar was introduced directly into the bladder (under visualization from cystoscope), followed by insertion of a 16 Fr ascencio as a suprapubic catheter. The suprapubic catheter was then secured to skin with 0-Nylon suture.   A 3-way 22 Fr ascencio catheter was placed in the bladder and CBI was started at low rate.  There were no complications and the patient tolerated the procedure well.    The patient was awakened from anesthesia and transferred to PACU in stable condition.  He will be admitted to regular floor over night.      Margret Lake MD  [facility]  10/11/2024

## 2024-10-11 NOTE — ANESTHESIA PREPROCEDURE EVALUATION
PRE-OP EVALUATION    Patient Name: Dada Stockton    Admit Diagnosis: INCOMPLETE BLADDER EMPTYING    Pre-op Diagnosis: INCOMPLETE BLADDER EMPTYING    CYSTOSCOPY TRANSURETHRAL RESECTION OF PROSTATE, SUPRAPUBIC CATHETER PLACEMENT    Anesthesia Procedure: CYSTOSCOPY TRANSURETHRAL RESECTION OF PROSTATE, SUPRAPUBIC CATHETER PLACEMENT (Perineum)    Surgeons and Role:     * Margret Lake MD - Primary    Pre-op vitals reviewed.  Temp: 97.1 °F (36.2 °C)  Pulse: 89  Resp: 18  BP: 125/84  SpO2: 95 %  Body mass index is 24.18 kg/m².    Current medications reviewed.  Hospital Medications:  • acetaminophen (Tylenol Extra Strength) tab 1,000 mg  1,000 mg Oral Once   • lactated ringers infusion   Intravenous Continuous   • vancomycin (Vancocin) 1,000 mg in sodium chloride 0.9% 250 mL IVPB-ADDV  15 mg/kg Intravenous Once    And   • gentamicin (Garamycin) 340 mg in sodium chloride 0.9% 100 mL IVPB  5 mg/kg (Ideal) Intravenous Once       Outpatient Medications:   Prescriptions Prior to Admission[1]    Allergies: Cephalosporins, Cat dander [dander], Polysporin [bacitracin-polymyxin b], and Vaseline [petrolatum]      Anesthesia Evaluation    Patient summary reviewed.    Anesthetic Complications  (-) history of anesthetic complications         GI/Hepatic/Renal                                 Cardiovascular  Comment: PVD                (+) hypertension   (+) hyperlipidemia  (+) CAD    (+) CABG/stent                            Endo/Other                                  Pulmonary      (+) asthma                     Neuro/Psych          (+) CVA   (+) TIA    (+) neuromuscular disease                   Past Surgical History:   Procedure Laterality Date   • Cabg     • Colonoscopy  11/17/06    normal (previous one revealed adenomatous poly)   • Colonoscopy  11/2012    Severe pan-colonic diverticulosis, 3 mm transverse adenoma, large internal hemorrhoids. reepat 5 yrs if healthy   • Create eardrum opening,gen anesth     • Cystourethroscopy   6/29/10    cysto, Dr. fragoso   • Other surgical history  8-19-10    PVP with hps laser, dr fragoso   • Other surgical history  3-15-11    flow US, dr fragoso   • Prostate biopsies  6-15-10    PNBx, dr fragoso   • Skin surgery  2021    BCC left mid cheek MMS by Dr Christine   • Skin surgery  2022    BCC left preuaricular MMS by Dr Christine   • Tonsillectomy      as child   • Tympanoplasty     • Upper gi endoscopy performed  21= Hiatal hernia     Social History     Socioeconomic History   • Marital status:    • Number of children: 2   Occupational History   • Occupation: Retired principal   Tobacco Use   • Smoking status: Former     Current packs/day: 0.00     Average packs/day: 0.2 packs/day for 58.0 years (11.6 ttl pk-yrs)     Types: Cigarettes     Start date: 1934     Quit date: 1992     Years since quittin.9   • Smokeless tobacco: Never   Vaping Use   • Vaping status: Never Used   Substance and Sexual Activity   • Alcohol use: Not Currently   • Drug use: No     History   Drug Use No     Available pre-op labs reviewed.               Airway      Mallampati: II  Mouth opening: 3 FB  TM distance: 4 - 6 cm  Neck ROM: full Cardiovascular      Rhythm: regular  Rate: normal     Dental  Comment: No loose teeth reported by patient           Pulmonary      Breath sounds clear to auscultation bilaterally.               Other findings        ASA: 3   Plan: general  NPO status verified and patient meets guidelines.        Comment: Discussed general anesthesia with endotracheal tube/supraglottic airway with patient. Discussed risk of oral/dental trauma, nausea, rare allergic reactions. Patient understands the risks, benefits, and requirement for anesthesia and willing to proceed. Consent signed.        Plan/risks discussed with: patient            Present on Admission:  **None**             [1]   Medications Prior to Admission   Medication Sig Dispense Refill Last Dose/Taking   • temazepam 15 MG Oral Cap Take  1 capsule (15 mg total) by mouth nightly as needed.   Taking As Needed   • amLODIPine 2.5 MG Oral Tab Take 1 tablet (2.5 mg total) by mouth at bedtime. Taken with 5 mg for a total of 7.5 mg daily   10/10/2024 Bedtime   • PATIENT SUPPLIED MEDICATION Occuvite 1 capsule HS   Past Week   • phenazopyridine 200 MG Oral Tab Take 1 tablet (200 mg total) by mouth 3 (three) times daily as needed for Pain.   Taking As Needed   • fluticasone-salmeterol 250-50 MCG/ACT Inhalation Aerosol Powder, Breath Activated Inhale 1 puff into the lungs as needed.   More than a month   • denosumab 60 MG/ML Subcutaneous Solution Prefilled Syringe Inject 1 mL (60 mg total) into the skin every 6 (six) months.   More than a month   • Melatonin 10 MG Oral Tab Take 20 mg by mouth at bedtime.   Taking   • ATORVASTATIN 40 MG Oral Tab TAKE 1 TABLET(40 MG) BY MOUTH DAILY (Patient taking differently: Take 1 tablet (40 mg total) by mouth nightly.) 90 tablet 1 10/10/2024 Bedtime   • METOPROLOL SUCCINATE 25 MG Oral Tablet 24 Hr TAKE 1 TABLET BY MOUTH EVERY DAY. (Patient taking differently: Take 1 tablet (25 mg total) by mouth 2 (two) times daily.) 90 tablet 1 10/11/2024 at 10:00 AM   • AMLODIPINE 5 MG Oral Tab TAKE 1 TABLET(5 MG) BY MOUTH DAILY (Patient taking differently: Take 1 tablet (5 mg total) by mouth nightly.) 90 tablet 2 10/10/2024 Bedtime   • LISINOPRIL 20 MG Oral Tab TAKE 1 TABLET(20 MG) BY MOUTH DAILY (Patient taking differently: Take 1 tablet (20 mg total) by mouth at bedtime.) 90 tablet 2 10/10/2024 Bedtime   • albuterol 108 (90 Base) MCG/ACT Inhalation Aero Soln Inhale 1-2 puffs into the lungs every 4 (four) hours as needed for Wheezing.   Taking As Needed   • Calcium Carbonate 600 MG Oral Tab Take 2 tablets (1,200 mg total) by mouth nightly.   10/10/2024 Evening

## 2024-10-11 NOTE — ANESTHESIA POSTPROCEDURE EVALUATION
[facility]    Dada Stockton Patient Status:  Hospital Outpatient Surgery   Age/Gender 89 year old male MRN [id number]   Location [facility] Attending Margret Lake MD   Hosp Day # 0 PCP Toñito Charles MD       Anesthesia Post-op Note    CYSTOSCOPY TRANSURETHRAL RESECTION OF PROSTATE, SUPRAPUBIC CATHETER PLACEMENT    Procedure Summary       Date: 10/11/24 Room / Location:  MAIN OR 07 / [facility] MAIN OR    Anesthesia Start: 1331 Anesthesia Stop: 1452    Procedure: CYSTOSCOPY TRANSURETHRAL RESECTION OF PROSTATE, SUPRAPUBIC CATHETER PLACEMENT (Perineum) Diagnosis: (INCOMPLETE BLADDER EMPTYING)    Surgeons: Margret Lake MD Anesthesiologist: Jesus Manuel Saul MD    Anesthesia Type: general ASA Status: 3            Anesthesia Type: general    Vitals Value Taken Time   /56 10/11/24 1452   Temp 98.3 10/11/24 1452   Pulse 87 10/11/24 1452   Resp 18 10/11/24 1452   SpO2 93 10/11/24 1452       Patient Location: PACU    Anesthesia Type: general    Airway Patency: patent and extubated    Postop Pain Control: adequate    Mental Status: preanesthetic baseline    Nausea/Vomiting: none    Cardiopulmonary/Hydration status: stable euvolemic    Complications: no apparent anesthesia related complications    Postop vital signs: stable    Dental Exam: Unchanged from Preop    Patient to be discharged from PACU when criteria met.

## 2024-10-11 NOTE — H&P
[facility]  [facility]  H&P Note    Dada Stockton Patient Status:  Hospital Outpatient Surgery    1934 MRN [id number]   Location [facility] PRE OP HOLDING Attending Margret Lake MD   Hosp Day # 0 PCP Toñito Charles MD     Chief complaint:  Urinary retention     History of Present Illness:  Dada Stockton is a a(n) 89 year old male with BPH and urinary retention admitted for TURP and suprapubic catheter placement     History:  Past Medical History:    Anxiety state    Arrhythmia    AFIB    Asthma (HCC)    ALLERGY ISSUE    Back pain    Back problem    BPH (benign prostatic hyperplasia)    BPH (benign prostatic hypertrophy)    s/p PVP    Cancer (HCC)    BASAL AND SQUAMOUS    Coronary atherosclerosis    Diverticula of colon    Elevated glucose    Elevated PSA    Hearing impairment    VHOH IN LEFT, [place] IN RIGHT ... USES HEARING AIDS    High blood pressure    High cholesterol    History of basal cell carcinoma    History of blood transfusion    ?DURING HEART SURGERY    History of squamous cell carcinoma of skin    Hyperlipidemia    Hypertension    graded stress test (), negative ischemia.  echo (), nl LVEF.    Osteoarthritis    Personal history of colonic polyps    Spinal stenosis    Stroke (HCC)    TIA; no residual    TIA (transient ischemic attack)    Visual impairment    GLASSES     Past Surgical History:   Procedure Laterality Date    Cabg      Colonoscopy  06    normal (previous one revealed adenomatous poly)    Colonoscopy  2012    Severe pan-colonic diverticulosis, 3 mm transverse adenoma, large internal hemorrhoids. reepat 5 yrs if healthy    Create eardrum opening,gen anesth      Cystourethroscopy  6/29/10    cystoDr. fragoso    Other surgical history  8-19-10    PVP with hps laser, dr fragoso    Other surgical history  3-15-11    flow US, dr fragoso    Prostate biopsies  6-15-10    PNBx, dr fragoso    Skin surgery  2021    BCC left mid cheek MMS by Dr Christine    Skin surgery   01/18/2022    BCC left preuaricular MMS by Dr Christine    Tonsillectomy      as child    Tympanoplasty      Upper gi endoscopy performed  4/21/21= Hiatal hernia     Family History   Problem Relation Age of Onset    Other Sister       reports that he quit smoking about 31 years ago. His smoking use included cigarettes. He started smoking about 89 years ago. He has a 11.6 pack-year smoking history. He has never used smokeless tobacco. He reports that he does not currently use alcohol. He reports that he does not use drugs.    Allergies:  Allergies[1]    Medications:    Current Facility-Administered Medications:     [Transfer Hold] acetaminophen (Tylenol Extra Strength) tab 1,000 mg, 1,000 mg, Oral, Once    lactated ringers infusion, , Intravenous, Continuous    vancomycin (Vancocin) 1,000 mg in sodium chloride 0.9% 250 mL IVPB-ADDV, 15 mg/kg, Intravenous, Once **AND** gentamicin (Garamycin) 340 mg in sodium chloride 0.9% 100 mL IVPB, 5 mg/kg (Ideal), Intravenous, Once    Review of Systems:  Pertinent items are noted in HPI.    Physical Exam:  /84 (BP Location: Left arm)   Pulse 89   Temp 97.1 °F (36.2 °C) (Oral)   Resp 18   Ht 5' 7\" (1.702 m)   Wt 154 lb 6.4 oz (70 kg)   SpO2 95%   BMI 24.18 kg/m²   GENERAL: well developed, well nourished, no apparent distress  EYES: sclera non-icteric, no redness   LUNGS: normal respiratory motion without distress  GI: Abdomen soft without organomegally, no tenderness  NEURO: Alert and Oriented, motor and sensory grossly non-focal   LYMPH:  No appreciable axillary, neck, or inguinal  Adenopathy  SKIN: No rashes, no discoloration  EXTREMITIES: No appreciable joint deformities       Impression/Plan:  BPH with urinary retention   Proceed with TURP, suprapubic catheter placement         Margret Lake MD  [facility]  10/11/2024  1:21 PM         [1]   Allergies  Allergen Reactions    Cephalosporins RASH    Cat Dander [Dander]     Polysporin [Bacitracin-Polymyxin B] ITCHING      MUPIROCIN 2% ointment OK per pt  MUPIROCIN 2% ointment OK per pt    Vaseline [Petrolatum] ITCHING     Pt reports aquaphor is okay

## 2024-10-11 NOTE — CONSULTS
[facility] General Medicine Consult      Reason for consult: Medical Management    Consulted by: Margret Lake    PCP: Toñito Charles MD      History of Present Illness: Patient is a 89 year old male with PMH sig for Metastatic prostate CA, CAD s/p CABG, Afib, HTN who presents for TURP/ suprapubic cathter placement. Postoperatively patient is doing well, hemodynamically stable. Pain is controlled.       PMH:  Past Medical History:    Anxiety state    Arrhythmia    AFIB    Asthma (HCC)    ALLERGY ISSUE    Back pain    Back problem    BPH (benign prostatic hyperplasia)    BPH (benign prostatic hypertrophy)    s/p PVP    Cancer (HCC)    BASAL AND SQUAMOUS    Coronary atherosclerosis    Diverticula of colon    Elevated glucose    Elevated PSA    Hearing impairment    VHOH IN LEFT, [place] IN RIGHT ... USES HEARING AIDS    High blood pressure    High cholesterol    History of basal cell carcinoma    History of blood transfusion    ?DURING HEART SURGERY    History of squamous cell carcinoma of skin    Hyperlipidemia    Hypertension    graded stress test (12/12), negative ischemia.  echo (1/13), nl LVEF.    Osteoarthritis    Personal history of colonic polyps    Spinal stenosis    Stroke (HCC)    TIA; no residual    TIA (transient ischemic attack)    Visual impairment    GLASSES        PSH:  Past Surgical History:   Procedure Laterality Date    Cabg      Colonoscopy  11/17/06    normal (previous one revealed adenomatous poly)    Colonoscopy  11/2012    Severe pan-colonic diverticulosis, 3 mm transverse adenoma, large internal hemorrhoids. reepat 5 yrs if healthy    Create eardrum opening,gen anesth      Cystourethroscopy  6/29/10    cysto, Dr. fragoso    Other surgical history  8-19-10    PVP with hps laser, dr fragoso    Other surgical history  3-15-11    flow US, dr fragoso    Prostate biopsies  6-15-10    PNBx, dr fragoso    Skin surgery  12/28/2021    BCC left mid cheek MMS by Dr Christine    Skin surgery  01/18/2022    BCC left  preuaricular MMS by Dr Christine    Tonsillectomy      as child    Tympanoplasty      Upper gi endoscopy performed  21= Hiatal hernia        Home Medications:  Medications Taking[1]    Scheduled Medication:    Continuous Infusing Medication:   lactated ringers 20 mL/hr at 10/11/24 1219    sodium chloride 50 mL/hr at 10/11/24 1530     PRN Medication:     ALL:  Allergies[2]     Soc Hx:  Social History     Tobacco Use    Smoking status: Former     Current packs/day: 0.00     Average packs/day: 0.2 packs/day for 58.0 years (11.6 ttl pk-yrs)     Types: Cigarettes     Start date: 1934     Quit date: 1992     Years since quittin.9    Smokeless tobacco: Never   Substance Use Topics    Alcohol use: Not Currently        Fam Hx  Family History   Problem Relation Age of Onset    Other Sister        Review of Systems  Comprehensive ROS reviewed and negative except for what's stated above.  Including negative for chest pain, shortness of breath, syncope.       OBJECTIVE:  /70   Pulse 83   Temp 98.3 °F (36.8 °C) (Temporal)   Resp 22   Ht 5' 7\" (1.702 m)   Wt 154 lb 6.4 oz (70 kg)   SpO2 95%   BMI 24.18 kg/m²   Physical Exam:  General: Alert, awake, cooperative.  HEENT:  Normocephalic, atraumatic.  Neck:  Trachea midline.  No JVD.   Chest:  Clear to auscultation bilaterally. No wheezes, rales, or rhonchi.  CV:  Regular rate and rhythm.  Positive S1/S2. No murmur, no gallops, no rubs  GI: Bowel sounds present in all four quadrants, abdomen is soft, non-tender, non-distended.  Extremities:  No lower extremity edema or cyanosis.  Neurological:  AAOx3. Moving extremities.   Skin:  Warm and dry.      Diagnostics:   CBC/Chem  No results for input(s): \"WBC\", \"HGB\", \"MCV\", \"PLT\", \"BAND\", \"INR\" in the last 168 hours.    Invalid input(s): \"LYM#\", \"MONO#\", \"BASOS#\", \"EOSIN#\"    No results for input(s): \"NA\", \"K\", \"CL\", \"CO2\", \"BUN\", \"CREATSERUM\", \"GLU\", \"CA\", \"CAION\", \"MG\", \"PHOS\" in the last 168 hours.    No  results for input(s): \"ALT\", \"AST\", \"ALB\", \"AMYLASE\", \"LIPASE\", \"LDH\" in the last 168 hours.    Invalid input(s): \"ALPHOS\", \"TBIL\", \"DBIL\", \"TPROT\"      Radiology: No results found.       ASSESSMENT / PLAN:  89 year old male with PMH sig for Metastatic prostate CA, CAD s/p CABG, Afib, HTN who presents for TURP/ suprapubic catheter placement.     #Metastatic prostate CA s/p TURP  #Recurrent bladder outlet obstruction  #Gross hematuria  -Pain control  -ADAT  -Bazzi care  -Management per urology    #Afib  #CAD s/p CABG  #HTN  -Not on AC or antiplatelet therapy  -Continue statin, metoprolol, lisinopril, amlodipine    Code Status: DNR/DNI    Outpatient records or previous hospital records reviewed.   [facility] hospitalist to continue to follow patient while in house  A total of 56 minutes taken with patient and coordinating care.  Greater than 50% face to face encounter.        Fátima Caceres [facility] Hospitalist                             [1]   Outpatient Medications Marked as Taking for the 10/11/24 encounter (Hospital Encounter)   Medication Sig Dispense Refill    temazepam 15 MG Oral Cap Take 1 capsule (15 mg total) by mouth nightly as needed.      Calcium Carbonate 600 MG Oral Tab Take 2 tablets (1,200 mg total) by mouth nightly.      amLODIPine 2.5 MG Oral Tab Take 1 tablet (2.5 mg total) by mouth at bedtime. Taken with 5 mg for a total of 7.5 mg daily      PATIENT SUPPLIED MEDICATION Occuvite 1 capsule HS      phenazopyridine 200 MG Oral Tab Take 1 tablet (200 mg total) by mouth 3 (three) times daily as needed for Pain.      Melatonin 10 MG Oral Tab Take 20 mg by mouth at bedtime.      ATORVASTATIN 40 MG Oral Tab TAKE 1 TABLET(40 MG) BY MOUTH DAILY (Patient taking differently: Take 1 tablet (40 mg total) by mouth nightly.) 90 tablet 1    METOPROLOL SUCCINATE 25 MG Oral Tablet 24 Hr TAKE 1 TABLET BY MOUTH EVERY DAY. (Patient taking differently: Take 1 tablet (25 mg total) by mouth 2 (two) times daily.) 90  tablet 1    AMLODIPINE 5 MG Oral Tab TAKE 1 TABLET(5 MG) BY MOUTH DAILY (Patient taking differently: Take 1 tablet (5 mg total) by mouth nightly.) 90 tablet 2    LISINOPRIL 20 MG Oral Tab TAKE 1 TABLET(20 MG) BY MOUTH DAILY (Patient taking differently: Take 1 tablet (20 mg total) by mouth at bedtime.) 90 tablet 2    albuterol 108 (90 Base) MCG/ACT Inhalation Aero Soln Inhale 1-2 puffs into the lungs every 4 (four) hours as needed for Wheezing.     [2]   Allergies  Allergen Reactions    Cephalosporins RASH    Cat Dander [Dander]     Polysporin [Bacitracin-Polymyxin B] ITCHING     MUPIROCIN 2% ointment OK per pt  MUPIROCIN 2% ointment OK per pt    Vaseline [Petrolatum] ITCHING     Pt reports aquaphor is okay

## 2024-10-11 NOTE — PLAN OF CARE
A&Ox4. VSS. RA. . Denies chest pain and SOB.   GI: Abdomen soft, nondistended. Due to pass gas, no belching reported.   Denies nausea.   : post operative ascencio catheter - CBI running slow/moderate. SPT plugged   Pain controlled with PRN pain medications.   Up with standby assist/walker   Drains: Ascencio, SPT   Incisions: Prostate   Diet: Clear liquid diet - ADAT   IVF running per order. All appropriate safety measures in place. All questions and concerns addressed.

## 2024-10-11 NOTE — PLAN OF CARE
PT presented onto unit from PACU with spouse at bedside   CBI running at slow/moderate rate, SPT plugged. Urine is clear with no blood clots present.

## 2024-10-12 LAB
ANION GAP SERPL CALC-SCNC: 9 MMOL/L (ref 0–18)
BASOPHILS # BLD AUTO: 0.04 X10(3) UL (ref 0–0.2)
BASOPHILS NFR BLD AUTO: 0.3 %
BUN BLD-MCNC: 20 MG/DL (ref 9–23)
CALCIUM BLD-MCNC: 7.9 MG/DL (ref 8.7–10.4)
CHLORIDE SERPL-SCNC: 108 MMOL/L (ref 98–112)
CO2 SERPL-SCNC: 22 MMOL/L (ref 21–32)
CREAT BLD-MCNC: 1.24 MG/DL
EGFRCR SERPLBLD CKD-EPI 2021: 56 ML/MIN/1.73M2 (ref 60–?)
EOSINOPHIL # BLD AUTO: 0.14 X10(3) UL (ref 0–0.7)
EOSINOPHIL NFR BLD AUTO: 1 %
ERYTHROCYTE [DISTWIDTH] IN BLOOD BY AUTOMATED COUNT: 13.9 %
GLUCOSE BLD-MCNC: 94 MG/DL (ref 70–99)
HCT VFR BLD AUTO: 31.2 %
HGB BLD-MCNC: 10.3 G/DL
IMM GRANULOCYTES # BLD AUTO: 0.09 X10(3) UL (ref 0–1)
IMM GRANULOCYTES NFR BLD: 0.6 %
LYMPHOCYTES # BLD AUTO: 0.97 X10(3) UL (ref 1–4)
LYMPHOCYTES NFR BLD AUTO: 6.9 %
MCH RBC QN AUTO: 29 PG (ref 26–34)
MCHC RBC AUTO-ENTMCNC: 33 G/DL (ref 31–37)
MCV RBC AUTO: 87.9 FL
MONOCYTES # BLD AUTO: 1.46 X10(3) UL (ref 0.1–1)
MONOCYTES NFR BLD AUTO: 10.5 %
NEUTROPHILS # BLD AUTO: 11.26 X10 (3) UL (ref 1.5–7.7)
NEUTROPHILS # BLD AUTO: 11.26 X10(3) UL (ref 1.5–7.7)
NEUTROPHILS NFR BLD AUTO: 80.7 %
OSMOLALITY SERPL CALC.SUM OF ELEC: 290 MOSM/KG (ref 275–295)
PLATELET # BLD AUTO: 288 10(3)UL (ref 150–450)
POTASSIUM SERPL-SCNC: 3.1 MMOL/L (ref 3.5–5.1)
RBC # BLD AUTO: 3.55 X10(6)UL
SODIUM SERPL-SCNC: 139 MMOL/L (ref 136–145)
WBC # BLD AUTO: 14 X10(3) UL (ref 4–11)

## 2024-10-12 PROCEDURE — 97116 GAIT TRAINING THERAPY: CPT

## 2024-10-12 PROCEDURE — 97161 PT EVAL LOW COMPLEX 20 MIN: CPT

## 2024-10-12 PROCEDURE — 80048 BASIC METABOLIC PNL TOTAL CA: CPT | Performed by: UROLOGY

## 2024-10-12 PROCEDURE — 85025 COMPLETE CBC W/AUTO DIFF WBC: CPT | Performed by: UROLOGY

## 2024-10-12 NOTE — PLAN OF CARE
A&Ox4. VSS. RA. Denies chest pain and SOB.   Telemetry: NSR.   GI: Abdomen soft, nondistended. Passing gas. No belching reported. Episode of diarrhea - C dif testing ordered.   Denies nausea.   : Ascencio catheter - CBI running at very slow rate. SPT plugged.   Pain controlled with PRN pain medications.   Up with standby assist/walker    Drains: SPT, ascencio  Incisions: Prostate   Diet: Soft/low fiber - tolerating well.   IVF running per order. All appropriate safety measures in place. All questions and concerns addressed.

## 2024-10-12 NOTE — PHYSICAL THERAPY NOTE
PHYSICAL THERAPY EVALUATION - INPATIENT     Room Number: 331/331-A  Evaluation Date: 10/12/2024  Type of Evaluation: Initial  Physician Order: PT Eval and Treat    Presenting Problem: hematuria  Co-Morbidities : TIA, Mets prostate CA, CAD, HTN  Reason for Therapy: Mobility Dysfunction and Discharge Planning    PHYSICAL THERAPY ASSESSMENT   Patient is a 89 year old male admitted 10/11/2024 for TURP/suprabupic catheter placement.   Patient is currently functioning at baseline with bed mobility, transfers, and gait. Prior to admission, patient's baseline is modified indep with RW.  Pt able to advance to >household distances with RW at mod I level.  No further PT needs at this time        PLAN  Patient has been evaluated and presents with no skilled Physical Therapy needs at this time.  Patient discharged from Physical Therapy services.  Please re-order if a new functional limitation presents during this admission.    PT Device Recommendation: Rolling walker    GOALS  Patient was able to achieve the following goals ...    Patient was able to transfer Safely and independently   Patient able to ambulate on level surfaces Safely and independently     HOME SITUATION  Type of Home: Assisted living facility                        Lives With: Spouse    Drives: No         Prior Level of Mobile: has had gradual decline over the past 4 years, was highly active hiking, however due to CA diagnosis has not hiked since , recently  moved into skilled nursing with his wife, started using a RW over the past few weeks, amb 1000ft to dining wise    SUBJECTIVE  Eager to ambulate    OBJECTIVE     Fall Risk: Standard fall risk    WEIGHT BEARING RESTRICTION     PAIN ASSESSMENT  Ratin          COGNITION  A&Ox4, follows multistep commands, aware of deficits, safety awareness appropriate    RANGE OF MOTION AND STRENGTH ASSESSMENT  Upper extremity ROM and strength are within functional limits     Lower extremity ROM is within functional limits      Lower extremity strength is within functional limits : R ankle chronic weakness - 3/5DF    BALANCE                ADDITIONAL TESTS                                    ACTIVITY TOLERANCE  Pulse: 73                      O2 WALK  Oxygen Therapy  SPO2% Ambulation on Room Air: 100    NEUROLOGICAL FINDINGS                        AM-PAC '6-Clicks' INPATIENT SHORT FORM - BASIC MOBILITY  How much difficulty does the patient currently have...  Patient Difficulty: Turning over in bed (including adjusting bedclothes, sheets and blankets)?: None   Patient Difficulty: Sitting down on and standing up from a chair with arms (e.g., wheelchair, bedside commode, etc.): None   Patient Difficulty: Moving from lying on back to sitting on the side of the bed?: None   How much help from another person does the patient currently need...   Help from Another: Moving to and from a bed to a chair (including a wheelchair)?: None   Help from Another: Need to walk in hospital room?: None   Help from Another: Climbing 3-5 steps with a railing?: A Little       AM-PAC Score:  Raw Score: 23   Approx Degree of Impairment: 11.2%   Standardized Score (AM-PAC Scale): 56.93   CMS Modifier (G-Code): CI    FUNCTIONAL ABILITY STATUS  Gait Assessment   Functional Mobility/Gait Assessment  Gait Assistance: Modified independent  Distance (ft): 200  Assistive Device: Rolling walker    Skilled Therapy Provided     Bed Mobility:  Rolling: mod I  Supine to sit: mod I   Sit to supine: mod I     Transfer Mobility:  Sit to stand: mod I   Stand to sit: mod I  Gait = 200ft with RW mod I      Exercise/Education Provided:  Activity recs, walking program, pt in agreement      Patient End of Session: Up in chair;Needs met;Call light within reach;RN aware of session/findings;All patient questions and concerns addressed;[facility] anti-slip socks    Patient Evaluation Complexity Level:  History Moderate - 1 or 2 personal factors and/or co-morbidities   Examination of body  systems Low -  addressing 1-2 elements   Clinical Presentation Low- Stable   Clinical Decision Making Low Complexity       PT Session Time: 30 minutes  Gait Training: 10 minutes

## 2024-10-12 NOTE — PROGRESS NOTES
[facility] Urology Progress Note    Dada Stockton Patient Status:  Inpatient    1934 MRN [id number]   Location [facility] 3NW-A Attending Margret Lake MD   Hosp Day # 1 PCP Toñito Charles MD     Subjective:  Dada Stockton is a(n) 89 year old male.    Hospital course to date: PD#1 s/p TURP SPT placement    Current  complaints: He is feeling well. Minimal hematuria. Minimal pain. He had diarrhea this am.    Objective:  Temp (24hrs), Av.1 °F (36.7 °C), Min:97.7 °F (36.5 °C), Max:98.4 °F (36.9 °C)    /55 (BP Location: Left arm)   Pulse 77   Temp 98.2 °F (36.8 °C) (Oral)   Resp 15   Ht 5' 7\" (1.702 m)   Wt 154 lb (69.9 kg)   SpO2 92%   BMI 24.12 kg/m²     Intake/Output Summary (Last 24 hours) at 10/12/2024 1207  Last data filed at 10/12/2024 0946  Gross per 24 hour   Intake 1220 ml   Output -1700 ml   Net 2920 ml     General: Calm, NAD  HEENT: NCAT, no scleral icterus  CV: RR  Pulmonary: breathing unlabored, symmetric bilaterally, no audible wheezes  Abdomen: soft, NT, ND, no masses, no rebound, no guarding, no rigidity  : SPT site c/d/I and plugged  Bazzi catheter present, off traction, draining clear yellow urine to gravity        Laboratory Data:  Lab Results   Component Value Date    WBC 14.0 10/12/2024    HGB 10.3 10/12/2024    HCT 31.2 10/12/2024    .0 10/12/2024    CREATSERUM 1.24 10/12/2024    BUN 20 10/12/2024     10/12/2024    K 3.1 10/12/2024     10/12/2024    CO2 22.0 10/12/2024    GLU 94 10/12/2024    CA 7.9 10/12/2024         Hospital Problem List:  Patient Active Problem List   Diagnosis    HTN (hypertension)    PIN (prostatic intraepithelial neoplasia)    Personal history of colonic polyps    History of squamous cell carcinoma of skin    History of basal cell carcinoma (BCC) of skin    Elevated prostate specific antigen (PSA)    Sensorineural hearing loss, asymmetrical    Complex renal cyst, LK UP, found 2015     Tympanic membrane perforation, left    Mixed hyperlipidemia    S/P CABG x 3  (LIMA-LAD, SVG-OM, SVG-PDA) 5/20/2017    PAF (paroxysmal atrial fibrillation) (HCC)    Aorto-iliac atherosclerosis (HCC)    IFG (impaired fasting glucose)    Bilateral carotid artery stenosis    Coronary artery disease involving native coronary artery of native heart without angina pectoris    Sacroiliitis (HCC)    Small vessel disease (HCC)    Lumbar spondylosis    Arthritis of facet joint of lumbar spine    Lumbar foraminal stenosis    Spinal stenosis    Actinic keratoses    Mild persistent asthma without complication (HCC)    History of lumbar laminectomy for spinal cord decompression    TIA due to embolism (HCC)    S/P spinal fusion    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 POD#1 s/p TURP and SPT for BPH with urinary retention/PCa  -10/12/24 Urine clear    PLAN    1. Continue urethral ascencio, titrate CBI  2. Keep SPT clamped for now  3. Possible DC tomorrow      The above impression and plan were discussed in detail with the patient who verbalized understanding and all questions were answered.    Don Strauss D.O.  [facility]  Department of Urology      10/12/2024  12:07 PM

## 2024-10-12 NOTE — PLAN OF CARE
A&ox4. VSS on 3L o2, pt desat when sleeping. Pt denies chest pain, sob, n/v. SCDs on bilaterally, denies calf pain or tenderness. Abdomen soft nondistended, tenderness around SPT.  Belching present, denies passing gas. CBI running slow, kelly urine, no clots. SPT clamped, dressing noted to have urine and serosanguinous drainage, dressing changed Pt complaining of mild to moderate pain- states chronic back pain, scheduled tylenol given, pt asleep upon reassessment.  rn skin check done w/RJ:Bruising to right knee noted pt states from fall at home. Dry, flaky skin to BUE. Spt site to lower mid abdomen. Heels and sacrum intact. Tolerating soft diet, low appetite. IVF running per order. Ambulates using walker at home. Pt updated on poc, call light within reach

## 2024-10-13 NOTE — PROGRESS NOTES
[facility] Urology Progress Note    Dada Stockton Patient Status:  Inpatient    1934 MRN [id number]   Location [facility] 3NW-A Attending Margret Lake MD   Hosp Day # 0 PCP Toñito Charles MD     Subjective:  Dada Stockton is a(n) 89 year old male.    Hospital course to date: POD#2 s/p TURP SPT placement    Current  complaints: He is feeling well. Urine clear. No pain.    Objective:  Temp (24hrs), Av °F (36.7 °C), Min:97.8 °F (36.6 °C), Max:98.2 °F (36.8 °C)    /78 (BP Location: Left arm)   Pulse 77   Temp 98 °F (36.7 °C) (Oral)   Resp 18   Ht 5' 7\" (1.702 m)   Wt 154 lb (69.9 kg)   SpO2 92%   BMI 24.12 kg/m²     Intake/Output Summary (Last 24 hours) at 10/13/2024 0937  Last data filed at 10/13/2024 0515  Gross per 24 hour   Intake 1703 ml   Output 800 ml   Net 903 ml     General: Calm, NAD  HEENT: NCAT, no scleral icterus  CV: RR  Pulmonary: breathing unlabored, symmetric bilaterally, no audible wheezes  Abdomen: soft, NT, ND, no masses, no rebound, no guarding, no rigidity  : SPT site c/d/I and plugged  Bazzi catheter present, off traction, draining clear yellow urine to gravity        Laboratory Data:           Hospital Problem List:  Patient Active Problem List   Diagnosis    HTN (hypertension)    PIN (prostatic intraepithelial neoplasia)    Personal history of colonic polyps    History of squamous cell carcinoma of skin    History of basal cell carcinoma (BCC) of skin    Elevated prostate specific antigen (PSA)    Sensorineural hearing loss, asymmetrical    Complex renal cyst, LK UP, found 2015    Tympanic membrane perforation, left    Mixed hyperlipidemia    S/P CABG x 3  (LIMA-LAD, SVG-OM, SVG-PDA) 2017    PAF (paroxysmal atrial fibrillation) (HCC)    Aorto-iliac atherosclerosis (HCC)    IFG (impaired fasting glucose)    Bilateral carotid artery stenosis    Coronary artery disease involving native coronary artery of native heart  without angina pectoris    Sacroiliitis (HCC)    Small vessel disease (HCC)    Lumbar spondylosis    Arthritis of facet joint of lumbar spine    Lumbar foraminal stenosis    Spinal stenosis    Actinic keratoses    Mild persistent asthma without complication (HCC)    History of lumbar laminectomy for spinal cord decompression    TIA due to embolism (HCC)    S/P spinal fusion    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 POD#2 s/p TURP and SPT for BPH with urinary retention/PCa  -10/13/24 Urine clear    PLAN    1. Continue urethral ascencio, clamp CBI  2.  Will plan to dc ascencio tomorrow and open SPT to gravity drainage - given his history he is still high risk for urethral bleeding so will plan to keep today and likely dc tomorrow.      The above impression and plan were discussed in detail with the patient who verbalized understanding and all questions were answered.    Discussed with NICOLE.    Don Strauss D.O.  [facility]  Department of Urology

## 2024-10-13 NOTE — PLAN OF CARE
A&ox4. VSS on 1L o2, pt desat when sleeping. Pt denies chest pain, sob, n/v. SCDs declined, denies calf pain or tenderness. Abdomen soft nondistended, tenderness around SPT.  Passing gas.  CBI running slow, kelly urine, no clots. SPT clamped. Pt complaining of mild to moderate pain, declined medication at this time. Bruising to right knee and left hand, pt states he fell at home. Dry, flaky skin to BUE. Tolerating regular diet. IV SL. Ambulates using walker at home. Pt updated on poc, call light within reach.

## 2024-10-13 NOTE — PROGRESS NOTES
[facility] Hospitalist Progress Note     PCP: Toñito Charles MD    SUBJECTIVE:  No CP, SOB, or N/V.    OBJECTIVE:  Temp:  [97.8 °F (36.6 °C)-98.2 °F (36.8 °C)] 98.1 °F (36.7 °C)  Pulse:  [73-99] 77  Resp:  [15-18] 18  BP: (113-133)/(53-63) 118/62  SpO2:  [90 %-95 %] 94 %    Intake/Output:    Intake/Output Summary (Last 24 hours) at 10/13/2024 0143  Last data filed at 10/13/2024 0100  Gross per 24 hour   Intake 1370 ml   Output -1100 ml   Net 2470 ml       Last 3 Weights   10/11/24 1600 154 lb (69.9 kg)   10/11/24 1157 154 lb 6.4 oz (70 kg)   09/30/24 1413 165 lb (74.8 kg)   05/31/24 1437 165 lb (74.8 kg)   11/20/22 1119 167 lb (75.8 kg)       Exam  Physical Exam:  General: Alert, awake, cooperative.  HEENT:  Normocephalic, atraumatic.  Neck:  Trachea midline.  No JVD.   Chest:  Clear to auscultation bilaterally. No wheezes, rales, or rhonchi.  CV:  Regular rate and rhythm.  Positive S1/S2. No murmur, no gallops, no rubs  GI: Bowel sounds present in all four quadrants, abdomen is soft, non-tender, non-distended.  Extremities:  No lower extremity edema or cyanosis.  Neurological:  AAOx3. Moving extremities.   Skin:  Warm and dry.      Data Review:       Labs:     Recent Labs   Lab 10/12/24  0741   WBC 14.0*   HGB 10.3*   MCV 87.9   .0       Recent Labs   Lab 10/12/24  0741      K 3.1*      CO2 22.0   BUN 20   CREATSERUM 1.24   CA 7.9*   GLU 94       No results for input(s): \"ALT\", \"AST\", \"ALB\", \"AMYLASE\", \"LIPASE\", \"LDH\" in the last 168 hours.    Invalid input(s): \"ALPHOS\", \"TBIL\", \"DBIL\", \"TPROT\"    No results for input(s): \"PGLU\" in the last 168 hours.    No results for input(s): \"TROP\" in the last 168 hours.        Meds:      melatonin  10 mg Oral Nightly    atorvastatin  40 mg Oral Daily    acetaminophen  650 mg Oral Q4H While awake    docusate sodium  100 mg Oral Daily    [Held by provider] denosumab  60 mg Subcutaneous Q6 Months    amLODIPine  7.5 mg Oral Nightly    lisinopril  20 mg Oral Nightly     metoprolol succinate  25 mg Oral 2x Daily(Beta Blocker)      sodium chloride 50 mL/hr at 10/11/24 1530       albuterol    fluticasone-salmeterol    phenazopyridine    temazepam    oxyCODONE **OR** oxyCODONE    HYDROmorphone **OR** HYDROmorphone    ondansetron **OR** ondansetron    sennosides       Assessment/Plan:   89 year old male with PMH sig for Metastatic prostate CA, CAD s/p CABG, Afib, HTN who presents for TURP/ suprapubic catheter placement.      #Metastatic prostate CA s/p TURP  #Recurrent bladder outlet obstruction  #Gross hematuria  -Pain control  -ADAT  -CBI, Suprapubic cathter clamped today  -Management per urology     #Afib  #CAD s/p CABG  #HTN  -Not on AC or antiplatelet therapy  -Continue statin, metoprolol, lisinopril, amlodipine     Fátima Caceres DO  [facility]ist

## 2024-10-13 NOTE — PLAN OF CARE
Assumed patient care at 0730.  Vital signs stable.  Patient denies pain.  CBI clamped and draining kelly, no blood and no clots.  Suprapubic catheter clamped.

## 2024-10-13 NOTE — PROGRESS NOTES
[facility] Hospitalist note    PCP: Toñito Charles MD    Chief Complaint:  F/u hematuria    SUBJECTIVE:  Pt doing well. Brownish tinged urine in ascencio bag. Plan to stop cbi    OBJECTIVE:  Temp:  [97.8 °F (36.6 °C)-98.2 °F (36.8 °C)] 98 °F (36.7 °C)  Pulse:  [73-86] 77  Resp:  [15-18] 18  BP: (118-138)/(56-78) 138/78  SpO2:  [92 %-95 %] 92 %    Intake/Output:    Intake/Output Summary (Last 24 hours) at 10/13/2024 0931  Last data filed at 10/13/2024 0515  Gross per 24 hour   Intake 1703 ml   Output 800 ml   Net 903 ml       Last 3 Weights   10/11/24 1600 154 lb (69.9 kg)   10/11/24 1157 154 lb 6.4 oz (70 kg)   09/30/24 1413 165 lb (74.8 kg)   05/31/24 1437 165 lb (74.8 kg)   11/20/22 1119 167 lb (75.8 kg)       Exam  Gen: No acute distress  HEENT: anicteric sclera, MMM  Pulm: Lungs clear, normal respiratory effort  CV: Heart with regular rate and rhythm, no peripheral edema  Abd: Abdomen soft, nontender, nondistended, no organomegaly, bowel sounds present  : brownish tinged urine in ascencio bag  MSK: Full range of motion in extremities, no clubbing, no cyanosis  Skin: no rashes or lesions  Neuro: A&OX3, no focal deficits    Data Review:         Labs:     Recent Labs   Lab 10/12/24  0741   WBC 14.0*   HGB 10.3*   MCV 87.9   .0   NE 11.26*   LYMABS 0.97*       Recent Labs   Lab 10/12/24  0741      K 3.1*      CO2 22.0   BUN 20   CREATSERUM 1.24   CA 7.9*   GLU 94       No results for input(s): \"ALT\", \"AST\", \"ALB\", \"AMYLASE\", \"LIPASE\" in the last 168 hours.    Invalid input(s): \"ALPHOS\", \"TBIL\", \"DBIL\", \"TPROT\"    No results for input(s): \"TROP\", \"CK\", \"PBNP\", \"PCT\" in the last 168 hours.    No results for input(s): \"CRP\", \"MEHRDAD\", \"LDH\", \"DDIMER\" in the last 168 hours.    No results for input(s): \"PGLU\" in the last 168 hours.    Meds:   Scheduled Medication:   melatonin  10 mg Oral Nightly    atorvastatin  40 mg Oral Daily    acetaminophen  650 mg Oral Q4H While awake    docusate sodium  100 mg Oral Daily     [Held by provider] denosumab  60 mg Subcutaneous Q6 Months    amLODIPine  7.5 mg Oral Nightly    lisinopril  20 mg Oral Nightly    metoprolol succinate  25 mg Oral 2x Daily(Beta Blocker)     Continuous Infusing Medication:   sodium chloride 50 mL/hr at 10/11/24 1530     PRN Medication:  albuterol    fluticasone-salmeterol    phenazopyridine    temazepam    oxyCODONE **OR** oxyCODONE    HYDROmorphone **OR** HYDROmorphone    ondansetron **OR** ondansetron    sennosides       Microbiology:    No results found for this visit on 10/11/24.    Lab Results   Component Value Date    COVID19 Not Detected 11/20/2022    COVID19 Not Detected 04/19/2021        Assessment/Plan:   89 yo man with h/o metastatic prostate cancer, cad s/p cabg, afib, htn who presented for turp, suprapubic catheter placement    Metastatic prostate cancer s/p turp  Recurrent bladder outlet obstruction  Gross hematuria  - cbi being titrated and will be turned off today and has urethral ascencio in place, suprapubic catheter clamped yesterday. Planning to take ascencio out tomorrow and use suprapubic catheter.    Afib  Cad s/p cabg  Htn  - not on AC  - cont statin, bb, lisinopril, amlodipine    SCDs    Anticipate dc tomorrow.      Tosin Cohen MD  [facility] Hospitalist  Pager: [phone]

## 2024-10-14 NOTE — PROGRESS NOTES
[facility]  Urology Progress Note    Dada Stockton Patient Status:  Outpatient in a Bed    1934 MRN [id number]   Location [facility] 3NW-A Attending Margret Lake MD   Hosp Day # 0 PCP Toñito Charles MD     Subjective:  Dada Stockton is a(n) 89 year old male.    S/P cystoscopy, TURP, SPT placement 10/11/24 with Dr. Lake.      Current complaints: Pain controlled.  No nausea, vomiting, fever, or chills.  Ascencio catheter draining well.      Objective:  General appearance: alert, appears stated age, and cooperative  Blood pressure 125/55, pulse 74, temperature 98.7 °F (37.1 °C), temperature source Oral, resp. rate 20, height 5' 7\" (1.702 m), weight 154 lb (69.9 kg), SpO2 93%.  Lungs: non-labored respirations.    Abdomen: soft, non-tender  : urethral ascencio catheter in place draining light tea-colored urine. SPT in place capped.         Assessment:    HEMATURIA, URINARY RETENTION, BPH, PROSTATE CANCER  S/P cystoscopy, TURP, SPT placement 10/11/24  Afebrile, VSS    Plan:    Urethral ascencio catheter to be removed  Place SPT to gravity drainage  Monitor color/clarity of urine  Discussed discharge later pending no change in patient's clinical course and approval from hospitalist.  Patient wishes to be discharged tomorrow.      Above discussed with patient, nurse  Will update Dr. Sampson, Dr. Lake.    Lucia Melendrez PA-C  [facility]  Department of Urology  10/14/2024  6:44 AM

## 2024-10-14 NOTE — DISCHARGE INSTRUCTIONS
Home Care Instructions  CYSTOSCOPY/TRANSURETHRAL RESECTION PROSTATE  (TURP)     Your urine will probably be cranberry colored on and off for a couple of weeks. If your urine becomes thick and red, or if you have a large amount of clots when you urinate, call the doctor. Also call the doctor if you are unable to urinate.     Do not strain to have a bowel movement as this can cause bleeding (keep stools soft). I suggest the use of an Over-the-Counter stool softener.     Drink 6-10 glasses of water/juice each day. This will help flush the bladder out. You will want to slow down your fluid intake just prior to bedtime so you are not up all night urinating.     If you take aspirin or Ibuprofen (Advil, Nuprin, Motrin) you must have your doctor’s approval before taking them again. Check with your doctor before starting any other medications.     You may shower.     You may eat your usual diet.     No strenuous activity or lifting more than 15 pounds for 4 weeks.      10/14/2024    [facility] @ discharge  Phone  [phone]  Fax  [phone]      [facility] Urology  [phone]

## 2024-10-14 NOTE — CM/SW NOTE
Call received from Cecilio, representative with [facility] (phone )  he shared that patient resides @ [facility].  Called and confirmed patient resides in an apartment with his spouse.      Spoke with spoue Jane by phone.  She shared that [facility] was a hospice agency that they were thinking of using BUT @ discharge pt will not immediately go into hospice care.  Explained medicare guidelines--cannot simultaneously use hhc and hospice services together.  To provide patient with hard copy of HHC options--to email list to spouse @ [email or web address]  (sent)    Encouraged spouse to also visit data.medicare.gov

## 2024-10-14 NOTE — PROGRESS NOTES
A&Ox4. VSS. RA. .  GI: Abdomen soft, nondistended.   Denies nausea.  : Bazzi discontinued per order, suprapubic attached to drainage bag, kelly urine returned.   Pain controlled with PRN pain medications  Up with standby assist.  Diet:tolerating general  IV saline locked.  All appropriate safety measures in place. All questions and concerns addressed. Will continue to monitor

## 2024-10-14 NOTE — PLAN OF CARE
A&ox4. VSS on room air. pt desat occasionally when sleeping currently declining O2. Pt denies chest pain, sob, n/v. SCDs declined, denies calf pain or tenderness. Abdomen soft nondistended, tenderness around SPT.  Passing gas. kelly urine, no clots via ascencio catheter. SPT clamped. Pt complaining of mild to moderate pain, declined medication at this time. Bruising to right knee and left hand, pt states he fell at home. Dry, flaky skin to BUE. Tolerating regular diet. IV SL. Ambulates using walker at home. Pt updated on poc, call light within reach.

## 2024-10-15 VITALS
DIASTOLIC BLOOD PRESSURE: 71 MMHG | HEART RATE: 89 BPM | TEMPERATURE: 98 F | HEIGHT: 67 IN | BODY MASS INDEX: 24.17 KG/M2 | RESPIRATION RATE: 18 BRPM | OXYGEN SATURATION: 92 % | WEIGHT: 154 LBS | SYSTOLIC BLOOD PRESSURE: 125 MMHG

## 2024-10-15 NOTE — PROGRESS NOTES
[facility] Hospitalist note    PCP: Toñtio Charles MD    Chief Complaint:  F/u hematuria    SUBJECTIVE:  Pt felt weak/tired, has been in hospital 3 weeks total ([place], etc). Will get stronger. Walked halls yesterday.  No appetite. Has back pain/spondlylosis. Spt in place    OBJECTIVE:  Temp:  [97.4 °F (36.3 °C)-98.3 °F (36.8 °C)] 98.3 °F (36.8 °C)  Pulse:  [75-89] 89  Resp:  [18] 18  BP: (125-134)/(59-71) 125/71  SpO2:  [89 %-95 %] 92 %    Intake/Output:    Intake/Output Summary (Last 24 hours) at 10/15/2024 1141  Last data filed at 10/15/2024 1022  Gross per 24 hour   Intake 120 ml   Output 1075 ml   Net -955 ml       Last 3 Weights   10/11/24 1600 154 lb (69.9 kg)   10/11/24 1157 154 lb 6.4 oz (70 kg)   09/30/24 1413 165 lb (74.8 kg)   05/31/24 1437 165 lb (74.8 kg)   11/20/22 1119 167 lb (75.8 kg)       Exam  Gen: No acute distress  HEENT: anicteric sclera, MMM  Pulm: Lungs clear, normal respiratory effort  CV: Heart with regular rate and rhythm, no peripheral edema  Abd: Abdomen soft, nontender, nondistended, no organomegaly, bowel sounds present  : brownish tinged urine in ascencio bag  MSK: Full range of motion in extremities, no clubbing, no cyanosis  Skin: no rashes or lesions  Neuro: A&OX3, no focal deficits    Data Review:         Labs:     Recent Labs   Lab 10/12/24  0741   WBC 14.0*   HGB 10.3*   MCV 87.9   .0   NE 11.26*   LYMABS 0.97*       Recent Labs   Lab 10/12/24  0741      K 3.1*      CO2 22.0   BUN 20   CREATSERUM 1.24   CA 7.9*   GLU 94       No results for input(s): \"ALT\", \"AST\", \"ALB\", \"AMYLASE\", \"LIPASE\" in the last 168 hours.    Invalid input(s): \"ALPHOS\", \"TBIL\", \"DBIL\", \"TPROT\"    No results for input(s): \"TROP\", \"CK\", \"PBNP\", \"PCT\" in the last 168 hours.    No results for input(s): \"CRP\", \"MEHRDAD\", \"LDH\", \"DDIMER\" in the last 168 hours.    No results for input(s): \"PGLU\" in the last 168 hours.    Meds:   Scheduled Medication:   melatonin  10 mg Oral Nightly    atorvastatin   40 mg Oral Daily    acetaminophen  650 mg Oral Q4H While awake    docusate sodium  100 mg Oral Daily    [Held by provider] denosumab  60 mg Subcutaneous Q6 Months    amLODIPine  7.5 mg Oral Nightly    lisinopril  20 mg Oral Nightly    metoprolol succinate  25 mg Oral 2x Daily(Beta Blocker)     Continuous Infusing Medication:   sodium chloride 50 mL/hr at 10/11/24 1530     PRN Medication:  albuterol    fluticasone-salmeterol    phenazopyridine    temazepam    oxyCODONE **OR** oxyCODONE    HYDROmorphone **OR** HYDROmorphone    ondansetron **OR** ondansetron    sennosides       Microbiology:    No results found for this visit on 10/11/24.    Lab Results   Component Value Date    COVID19 Not Detected 11/20/2022    COVID19 Not Detected 04/19/2021        Assessment/Plan:   89 yo man with h/o metastatic prostate cancer, cad s/p cabg, afib, htn who presented for turp, suprapubic catheter placement    Metastatic prostate cancer s/p turp  Recurrent bladder outlet obstruction  Gross hematuria  - s/p cbi, urethral ascencio removed  - suprapubic catheter to gravity, draining   - follow urine color, yellow/mild red     Afib  Cad s/p cabg  Htn  - not on AC  - cont statin, bb, lisinopril, amlodipine    SCDs    dc     D/w RN and pt/wife     Kirk Jordan MD  [facility]  [phone]  10/15/2024  12:52 PM

## 2024-10-15 NOTE — PROGRESS NOTES
[facility]  Urology Progress Note    Dada Stockton Patient Status:  Outpatient in a Bed    1934 MRN [id number]   Location [facility] 3NW-A Attending Margret Lake MD   Hosp Day # 0 PCP Toñito Charles MD     Subjective:  Dada Stockton is a(n) 89 year old male.    S/P cystoscopy, TURP, SPT placement 10/11/24 with Dr. Lake.      Current complaints: Denies pain. No nausea, vomiting, fever, or chills.  Tolerating diet. Passing flatus.  Ambulating.      Urethral ascencio catheter removed yesterday.  No urethral bleeding.      Objective:  General appearance: alert, appears stated age, and cooperative  Blood pressure 125/71, pulse 83, temperature 98.3 °F (36.8 °C), temperature source Oral, resp. rate 18, height 5' 7\" (1.702 m), weight 154 lb (69.9 kg), SpO2 94%.  Lungs: non-labored respirations  Abdomen: soft, non-tender  SPT in place draining tea-colored urine (UOP - 975 mL total out yesterday)        Assessment:    HEMATURIA, URINARY RETENTION, BPH, PROSTATE CANCER  S/P cystoscopy, TURP, SPT placement 10/11/24  Afebrile, VSS    Plan:    CPM with SPT to gravity drainage  Monitor color/clarity of urine  Hospitalist following    Anticipate discharge later today pending no change in clinical course and approval from hospitalist.      Above discussed with patient, nurse  Will update Dr. Lake.    Lucia Melendrez PA-C  [facility]  Department of Urology  10/15/2024  6:07 AM

## 2024-10-15 NOTE — PROGRESS NOTES
A/Ox4, RA, VSS, SL, tolerates general diet well.  SpO2 dips while falling asleep, 1L nc applied.  Voiding via suprapubic catheter.  Mild complaints of pain managed with prn medication, no complaints of nausea.  Pt updated on plan of care, call light and belongings within reach, questions and concerns addressed.

## 2024-10-15 NOTE — PROGRESS NOTES
A&Ox4. VSS. RA. .  GI: Abdomen soft, nondistended. Passing gas.  Denies nausea.  : suprapubic catheter draining kelly urine.  Pain controlled with PRN pain medications  Up with standby assist with walker.  Diet:tolerating general  IV saline locked.  All appropriate safety measures in place. All questions and concerns addressed. Will continue to monitor

## 2024-10-15 NOTE — PROGRESS NOTES
[facility] Hospitalist note    PCP: Toñito Charles MD    Chief Complaint:  F/u hematuria    SUBJECTIVE:  Pt feels better rested now, was anxious earlier. +back pain. Wants to stay as not comfortable c dc today.     OBJECTIVE:  Temp:  [97.4 °F (36.3 °C)-98.7 °F (37.1 °C)] 98 °F (36.7 °C)  Pulse:  [74-76] 75  Resp:  [18-20] 18  BP: (125-134)/(55-73) 134/69  SpO2:  [89 %-95 %] 89 %    Intake/Output:    Intake/Output Summary (Last 24 hours) at 10/14/2024 2343  Last data filed at 10/14/2024 1944  Gross per 24 hour   Intake 320 ml   Output 875 ml   Net -555 ml       Last 3 Weights   10/11/24 1600 154 lb (69.9 kg)   10/11/24 1157 154 lb 6.4 oz (70 kg)   09/30/24 1413 165 lb (74.8 kg)   05/31/24 1437 165 lb (74.8 kg)   11/20/22 1119 167 lb (75.8 kg)       Exam  Gen: No acute distress  HEENT: anicteric sclera, MMM  Pulm: Lungs clear, normal respiratory effort  CV: Heart with regular rate and rhythm, no peripheral edema  Abd: Abdomen soft, nontender, nondistended, no organomegaly, bowel sounds present  : brownish tinged urine in ascencio bag  MSK: Full range of motion in extremities, no clubbing, no cyanosis  Skin: no rashes or lesions  Neuro: A&OX3, no focal deficits    Data Review:         Labs:     Recent Labs   Lab 10/12/24  0741   WBC 14.0*   HGB 10.3*   MCV 87.9   .0   NE 11.26*   LYMABS 0.97*       Recent Labs   Lab 10/12/24  0741      K 3.1*      CO2 22.0   BUN 20   CREATSERUM 1.24   CA 7.9*   GLU 94       No results for input(s): \"ALT\", \"AST\", \"ALB\", \"AMYLASE\", \"LIPASE\" in the last 168 hours.    Invalid input(s): \"ALPHOS\", \"TBIL\", \"DBIL\", \"TPROT\"    No results for input(s): \"TROP\", \"CK\", \"PBNP\", \"PCT\" in the last 168 hours.    No results for input(s): \"CRP\", \"MEHRDAD\", \"LDH\", \"DDIMER\" in the last 168 hours.    No results for input(s): \"PGLU\" in the last 168 hours.    Meds:   Scheduled Medication:   melatonin  10 mg Oral Nightly    atorvastatin  40 mg Oral Daily    acetaminophen  650 mg Oral Q4H While awake     docusate sodium  100 mg Oral Daily    [Held by provider] denosumab  60 mg Subcutaneous Q6 Months    amLODIPine  7.5 mg Oral Nightly    lisinopril  20 mg Oral Nightly    metoprolol succinate  25 mg Oral 2x Daily(Beta Blocker)     Continuous Infusing Medication:   sodium chloride 50 mL/hr at 10/11/24 1530     PRN Medication:  albuterol    fluticasone-salmeterol    phenazopyridine    temazepam    oxyCODONE **OR** oxyCODONE    HYDROmorphone **OR** HYDROmorphone    ondansetron **OR** ondansetron    sennosides       Microbiology:    No results found for this visit on 10/11/24.    Lab Results   Component Value Date    COVID19 Not Detected 11/20/2022    COVID19 Not Detected 04/19/2021        Assessment/Plan:   88 yo man with h/o metastatic prostate cancer, cad s/p cabg, afib, htn who presented for turp, suprapubic catheter placement    Metastatic prostate cancer s/p turp  Recurrent bladder outlet obstruction  Gross hematuria  - s/p cbi, urethral ascencio removed  - suprapubic catheter to gravity  - follow urine    Afib  Cad s/p cabg  Htn  - not on AC  - cont statin, bb, lisinopril, amlodipine    SCDs    Anticipate dc tomorrow. Pt anxious today    Reviewed chart including previous progress notes. D/w RN     Kirk Jordan MD  [facility] Hospitalist  [phone]  10/14/2024  11:48 PM

## 2024-10-15 NOTE — CM/SW NOTE
Wife selected [facility]  Phone  [phone]  Fax  [phone]    Reserved in [facility]

## 2024-10-15 NOTE — PROGRESS NOTES
IV removed - patient tolerated well. Discharge instructions provided, questions and concerns discussed with patient and spouse. Supplies provided to care for suprapubic catheter. Patient brought to [facility] in wheelchair with RN and wife.

## 2024-10-22 NOTE — DISCHARGE SUMMARY
[facility]  Discharge Summary    Dada Stockton Patient Status:  Outpatient in a Bed    1934 MRN [id number]   Location [facility] 3NW-A Attending No att. providers found   Hosp Day # 0 PCP Toñito Charles MD     Date of Admission: 10/11/2024  Date of Discharge: 10/15/2024     Admitting Diagnosis: Gross hematuria, incomplete bladder emptying     Discharge Diagnosis:   Patient Active Problem List   Diagnosis    HTN (hypertension)    PIN (prostatic intraepithelial neoplasia)    Personal history of colonic polyps    History of squamous cell carcinoma of skin    History of basal cell carcinoma (BCC) of skin    Elevated prostate specific antigen (PSA)    Sensorineural hearing loss, asymmetrical    Complex renal cyst, LK UP, found 2015    Tympanic membrane perforation, left    Mixed hyperlipidemia    S/P CABG x 3  (LIMA-LAD, SVG-OM, SVG-PDA) 2017    PAF (paroxysmal atrial fibrillation) (HCC)    Aorto-iliac atherosclerosis (HCC)    IFG (impaired fasting glucose)    Bilateral carotid artery stenosis    Coronary artery disease involving native coronary artery of native heart without angina pectoris    Sacroiliitis (HCC)    Small vessel disease (HCC)    Lumbar spondylosis    Arthritis of facet joint of lumbar spine    Lumbar foraminal stenosis    Spinal stenosis    Actinic keratoses    Mild persistent asthma without complication (HCC)    History of lumbar laminectomy for spinal cord decompression    TIA due to embolism (HCC)    S/P spinal fusion    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    Dry eye syndrome of bilateral lacrimal glands    Brain atrophy (HCC)    Secondary hypercoagulable state (HCC)    Squamous cell carcinoma in situ of skin of back    Gross hematuria       Reason for Admission: surgery for hematuria, incomplete bladder emptying     Hospital Course: stable     Consultations: IM team     Procedures: Suprapubic catheter placement, TURP     Complications:  None    Disposition:  [facility]     Discharge Condition: Good    Discharge Medications:   Discharge Medication List as of 10/15/2024 12:56 PM        CONTINUE these medications which have NOT CHANGED    Details   temazepam 15 MG Oral Cap Take 1 capsule (15 mg total) by mouth nightly as needed., Historical      Calcium Carbonate 600 MG Oral Tab Take 2 tablets (1,200 mg total) by mouth nightly., Historical      !! amLODIPine 2.5 MG Oral Tab Take 1 tablet (2.5 mg total) by mouth at bedtime. Taken with 5 mg for a total of 7.5 mg daily, Historical      PATIENT SUPPLIED MEDICATION Occuvite 1 capsule HS, Historical      phenazopyridine 200 MG Oral Tab Take 1 tablet (200 mg total) by mouth 3 (three) times daily as needed for Pain., Historical      Melatonin 10 MG Oral Tab Take 20 mg by mouth at bedtime., Historical      ATORVASTATIN 40 MG Oral Tab TAKE 1 TABLET(40 MG) BY MOUTH DAILY, Normal, Disp-90 tablet, R-1      METOPROLOL SUCCINATE 25 MG Oral Tablet 24 Hr TAKE 1 TABLET BY MOUTH EVERY DAY., Normal, Disp-90 tablet, R-1      !! AMLODIPINE 5 MG Oral Tab TAKE 1 TABLET(5 MG) BY MOUTH DAILY, Normal, Disp-90 tablet, R-2      LISINOPRIL 20 MG Oral Tab TAKE 1 TABLET(20 MG) BY MOUTH DAILY, Normal, Disp-90 tablet, R-2      albuterol 108 (90 Base) MCG/ACT Inhalation Aero Soln Inhale 1-2 puffs into the lungs every 4 (four) hours as needed for Wheezing., Historical      fluticasone-salmeterol 250-50 MCG/ACT Inhalation Aerosol Powder, Breath Activated Inhale 1 puff into the lungs as needed., Historical      denosumab 60 MG/ML Subcutaneous Solution Prefilled Syringe Inject 1 mL (60 mg total) into the skin every 6 (six) months., Historical       !! - Potential duplicate medications found. Please discuss with provider.          Follow up Visits: Follow-up with Dr Lake in 1 month     Margret Lake MD  10/22/2024

## (undated) DEVICE — 3M™ RED DOT™ MONITORING ELECTRODE WITH FOAM TAPE AND STICKY GEL, 50/BAG, 20/CASE, 72/PLT 2570: Brand: RED DOT™

## (undated) DEVICE — STERILE POLYISOPRENE POWDER-FREE SURGICAL GLOVES: Brand: PROTEXIS

## (undated) DEVICE — SUT ETHLN 2-0 18IN FS NABSRB BLK 26MM 3/8 CIR

## (undated) DEVICE — SUTURE VICRYL 1 OS-6

## (undated) DEVICE — Device

## (undated) DEVICE — #11 STERILE BLADE: Brand: POLYMER COATED BLADES

## (undated) DEVICE — KENDALL SCD EXPRESS SLEEVES, KNEE LENGTH, MEDIUM: Brand: KENDALL SCD

## (undated) DEVICE — HF-RESECTION ELECTRODE PLASMA-OVALBUTTON BUTTON, OVAL, 24 FR., 12°-30°, ESG TURIS: Brand: OLYMPUS

## (undated) DEVICE — 3.0MM PRECISION ROUND

## (undated) DEVICE — LAMINECTOMY CDS: Brand: MEDLINE INDUSTRIES, INC.

## (undated) DEVICE — SLEEVE COMPR MD KNEE LEN SGL USE KENDALL SCD

## (undated) DEVICE — UNDYED BRAIDED (POLYGLACTIN 910), SYNTHETIC ABSORBABLE SUTURE: Brand: COATED VICRYL

## (undated) DEVICE — FLOSEAL HEMOSTATIC MATRIX, 5ML: Brand: FLOSEAL HEMOSTATIC MATRIX

## (undated) DEVICE — BIPOLAR SEALER 23-112-1 AQM 6.0: Brand: AQUAMANTYS™

## (undated) DEVICE — TRANSPOSAL ULTRAFLEX DUO/QUAD ULTRA CART MANIFOLD

## (undated) DEVICE — PACK PBDS CYSTOSCOPY

## (undated) DEVICE — DRILL SRG OIL CRTDG MAESTRO

## (undated) DEVICE — 3M™ MICROFOAM™ TAPE 1528-4: Brand: 3M™ MICROFOAM™

## (undated) DEVICE — SOLUTION IRRIG 1000ML ST H2O AQUALITE PLAS

## (undated) DEVICE — PATCH DURAL DURAMATRIX 1X1: Type: IMPLANTABLE DEVICE | Site: BACK | Status: NON-FUNCTIONAL

## (undated) DEVICE — 1200CC GUARDIAN II: Brand: GUARDIAN

## (undated) DEVICE — SOL  .9 1000ML BTL

## (undated) DEVICE — GLOVE SUR 7.5 SENSICARE PI PIP CRM PWD F

## (undated) DEVICE — ENDOSCOPY PACK UPPER: Brand: MEDLINE INDUSTRIES, INC.

## (undated) DEVICE — WRAP COOLING BACK W/NO PILLOW

## (undated) DEVICE — SUTURE VICRYL 2-0 FSL

## (undated) DEVICE — Device: Brand: DEFENDO AIR/WATER/SUCTION AND BIOPSY VALVE

## (undated) DEVICE — SOLUTION IRRIG 3000ML 0.9% NACL FLX CONT

## (undated) DEVICE — LIGHT HANDLE

## (undated) DEVICE — FILTERLINE NASAL ADULT O2/CO2

## (undated) DEVICE — DRAIN RELIAVAC W/DRN MED 1/8

## (undated) NOTE — MR AVS SNAPSHOT
After Visit Summary   11/11/2020    Shaunna Stallings    MRN: [id number]           Visit Information     Date & Time  11/11/2020 12:00 PM Provider  [facility].  Phone  [phone]      Allergies as of 11/11/2020  [facility] Provider Department    12/23/2020 11:40 AM Elizabeth Tolentino Neurology - [address]    1/25/2021 10:30 AM SHELLI Frost [address]    4/22/2021 11:40 AM Paul Branch Cardiology - [address] or injury that are   non-life-threatening.   Also available by appointment     Average cost  $70*       [address]   Monday – Friday  10:00 am – 10:00 pm   Saturday – Sunday  1

## (undated) NOTE — ED AVS SNAPSHOT
[facility] Emergency Department    [place]  [address] [place] [address]    Phone:  [phone]    Fax:  Niki Cruz   MRN: [id number]    Department:  [facility] Emergency Department   Date of Visit:  5/14 [facility] PHYSICAL THERAPY [facility])    [address] [place] [address]   [phone]            Jun 13, 2017 10:00 AM   REHABILITATION with Kumar Mandujano PT   [facility] PHYSICAL THERAPY (Christine Yeung )    [address] [address] [address] [facility] [employer] [place] [phone]  Pediatric [phone] Emergency Department   [phone]       To Check ER Wait Times:  TEXT 'ERwait' to 00945      Click [email or web address]      Or call [phone]    If you have any will be contacted. Please make sure we have your correct phone number before you leave. After you leave, you should follow the attached instructions. I have read and understand the instructions given to me by my caregivers.         24-Hour Pharmacies XR CHEST AP PORTABLE  (CPT=71010) (Final result) Result time:  05/14/17 10:03:36    Final result    Impression:    CONCLUSION:  No active cardiopulmonary process identified.            Dictated by: Kendra Caputo MD on 5/14/2017 at 10:02       Approved b

## (undated) NOTE — LETTER
[address]  [address] [facility] [address], [address] [facility]  Authorization for Surgical Operation and Procedure     Date:___April 20,2021________                                                                                                         Time potential risks that can occur: fever and allergic reactions, hemolytic reactions, transmission of diseases such as Hepatitis, AIDS and Cytomegalovirus (CMV) and fluid overload.   In the event that I wish to have an autologous transfusion of my own blood, o will determine when the applicable recovery period ends for purposes of reinstating the DNAR order.   10. Patients having a sterilization procedure: I understand that if the procedure is successful the results will be permanent and it will therefore be impo (anesthesia doctor) to give me medicine and do additional procedures as necessary.  Some examples are: Starting or using an “IV” to give me medicine, fluids or blood during my procedure, and having a breathing tube placed to help me breathe when I’m asleep blocks”): I understand that rare but potential complications include headache, bleeding, infection, seizure, irregular heart rhythms, and nerve injury.     I can change my mind about having anesthesia services at any time before I get the medicine.    ____

## (undated) NOTE — ED AVS SNAPSHOT
Edison Brady   MRN: [id number]    Department:  [facility] Emergency Department   Date of Visit:  12/8/2019           Disclosure     Insurance plans vary and the physician(s) referred by the ER may not be covered by your plan.  Please contact you tell this physician (or your personal doctor if your instructions are to return to your personal doctor) about any new or lasting problems. The primary care or specialist physician will see patients referred from the [facility] Emergency Department.  Isidro Richard

## (undated) NOTE — ED AVS SNAPSHOT
[facility] Emergency Department    [place]  [address] [place] [address]    Phone:  [phone]    Fax:  Niki Cruz   MRN: [id number]    Department:  [facility] Emergency Department   Date of Visit:  5/14 IF THERE IS ANY CHANGE OR WORSENING OF YOUR CONDITION, CALL YOUR PRIMARY CARE PHYSICIAN AT ONCE OR RETURN IMMEDIATELY TO THE EMERGENCY DEPARTMENT.     If you have been prescribed any medication(s), please fill your prescription right away and begin taking t

## (undated) NOTE — LETTER
OUTSIDE TESTING RESULT REQUEST     IMPORTANT: FOR YOUR IMMEDIATE ATTENTION  Please FAX all test results listed below to: [phone]     Testing already done on or about: 10/8/24     * * * * If testing is NOT complete, arrange with patient A.S.A.P. * * * *      Patient Name: Dada Stockton  Surgery Date: 10/11/2024  Medical Record: [id number]  CSN: [id number]  : 1934 - A: 89 y     Sex: male  Surgeon(s):  Margret Lake MD  Procedure: CYSTOSCOPY TRANSURETHRAL RESECTION PROSTATE, SUPRAPUBIC CATHETER PLACEMENT  Anesthesia Type: General     Surgeon: Margret Lake MD     The following Testing and Time Line are REQUIRED PER ANESTHESIA     {EDW PAT SENDOUT:4110}      Thank You,   Sent by:***